# Patient Record
Sex: FEMALE | Race: WHITE | NOT HISPANIC OR LATINO
[De-identification: names, ages, dates, MRNs, and addresses within clinical notes are randomized per-mention and may not be internally consistent; named-entity substitution may affect disease eponyms.]

---

## 2017-07-29 ENCOUNTER — HEALTH MAINTENANCE LETTER (OUTPATIENT)
Age: 42
End: 2017-07-29

## 2017-08-17 ENCOUNTER — OFFICE VISIT (OUTPATIENT)
Dept: SURGERY | Facility: CLINIC | Age: 42
End: 2017-08-17
Payer: COMMERCIAL

## 2017-08-17 VITALS
WEIGHT: 190 LBS | HEART RATE: 65 BPM | DIASTOLIC BLOOD PRESSURE: 83 MMHG | HEIGHT: 68 IN | BODY MASS INDEX: 28.79 KG/M2 | SYSTOLIC BLOOD PRESSURE: 129 MMHG

## 2017-08-17 DIAGNOSIS — K43.2 INCISIONAL HERNIA, WITHOUT OBSTRUCTION OR GANGRENE: Primary | ICD-10-CM

## 2017-08-17 PROCEDURE — 99203 OFFICE O/P NEW LOW 30 MIN: CPT | Performed by: SURGERY

## 2017-08-17 NOTE — MR AVS SNAPSHOT
"              After Visit Summary   2017    Payton Graf    MRN: [id number]           Patient Information     Date Of Birth          1975        Visit Information        Provider Department      2017 10:15 AM Vishnu Melchor MD [facility]      Care Instructions    Your surgery is scheduled for 17 9:00 am at [facility]          Follow-ups after your visit        Who to contact     If you have questions or need follow up information about today's clinic visit or your schedule please contact [facility] directly at [phone].  Normal or non-critical lab and imaging results will be communicated to you by Zentacthart, letter or phone within 4 business days after the clinic has received the results. If you do not hear from us within 7 days, please contact the clinic through Zentacthart or phone. If you have a critical or abnormal lab result, we will notify you by phone as soon as possible.  Submit refill requests through TapMyBack or call your pharmacy and they will forward the refill request to us. Please allow 3 business days for your refill to be completed.          Additional Information About Your Visit        MyChart Information     TapMyBack lets you send messages to your doctor, view your test results, renew your prescriptions, schedule appointments and more. To sign up, go to [email or web address] . Click on \"Log in\" on the left side of the screen, which will take you to the Welcome page. Then click on \"Sign up Now\" on the right side of the page.     You will be asked to enter the access code listed below, as well as some personal information. Please follow the directions to create your username and password.     Your access code is: [email or web address]  Expires: 11/15/2017 11:13 AM     Your access code will  in 90 days. If you need help or a new code, please call your [place] clinic or [phone].   " "     Care EveryWhere ID     This is your Care EveryWhere ID. This could be used by other organizations to access your [place] medical records  [id number]        Your Vitals Were     Pulse Height BMI (Body Mass Index)             65 5' 8\" (1.727 m) 28.89 kg/m2          Blood Pressure from Last 3 Encounters:   08/17/17 129/83   01/20/04 120/78    Weight from Last 3 Encounters:   08/17/17 190 lb (86.2 kg)   01/20/04 156 lb (70.8 kg)              Today, you had the following     No orders found for display       Primary Care Provider Office Phone # Fax #    Alla Limon [phone]       [facility] [address]  [facility] [address]        Equal Access to Services     ARELY RUSSELL : Hadii aad ku hadasho Soomaali, waaxda luqadaha, qaybta kaalmada adeegyada, waxay dalilain hayjorge kaiser . So [facility] [phone].    ATENCIÓN: Si habla español, tiene a ramirez disposición servicios gratuitos de asistencia lingüística. Lamar al [phone].    We comply with applicable federal civil rights laws and [place] laws. We do not discriminate on the basis of race, color, national origin, age, disability sex, sexual orientation or gender identity.            Thank you!     Thank you for choosing [facility]  for your care. Our goal is always to provide you with excellent care. Hearing back from our patients is one way we can continue to improve our services. Please take a few minutes to complete the written survey that you may receive in the mail after your visit with us. Thank you!             Your Updated Medication List - Protect others around you: Learn how to safely use, store and throw away your medicines at www.disposemymeds.org.          This list is accurate as of: 8/17/17 11:13 AM.  Always use your most recent med list.                   Brand Name Dispense Instructions for use Diagnosis    CLOMID 50 MG tablet   Generic drug:  clomiPHENE      1 tab po days " 3-7        PROBIOTIC + OMEGA-3 PO           TAMIFLU 75 MG capsule   Generic drug:  oseltamivir     10    1 CAPSULE TWICE DAILY    Influenza with other respiratory manifestations       VITAMIN C PO           VITAMIN D (CHOLECALCIFEROL) PO      Take by mouth daily

## 2017-08-17 NOTE — PROGRESS NOTES
"[facility]  Surgery Consultation    HPI: Patient is a 42 year old female who is here for consultation requested by Alla Fu [phone] for evaluation of incisional hernia. The patient underwent a myomectomy at [facility]. This is done through a midline incision. She was known to have some supraumbilical hernias that were repaired during this operation. She developed a small protrusion on the right side of her incision site. Over the last year it has increased in size substantially. She now has a very large hernia. She is found to have multiple hernias along the upper portion of her incision site as well. She does not have severe pain but it is uncomfortable. She is having regular bowel movements and tolerating a regular diet. She denies other complaints today. She denies fevers, shortness of breath, chest pain, or bowel movement issues.    PMH:  None  PSH:   Myomectomy with hernia repair   Social History:   reports that she has never smoked. She does not have any smokeless tobacco history on file. She reports that she does not drink alcohol or use illicit drugs.  Family History:  Reviewed and noncontributory.  Medications/Allergies: Home medications and allergies reviewed.    ROS:  The 10 point Review of Systems is negative other than noted in the HPI.    Physical Exam:  /83  Pulse 65  Ht 5' 8\" (1.727 m)  Wt 190 lb (86.2 kg)  BMI 28.89 kg/m2  GENERAL: Generally appears well.  Psych: Alert and Oriented.  Normal affect  Eyes: Sclera clear  Respiratory:  Lungs clear to ausculation bilaterally with good air excursion  Cardiovascular:  Regular Rate and Rhythm with no murmurs gallops or rubs, normal peripheral pulses  GI: Abdomen soft. Large incisional hernia with protrusion onto the right. Able to reduce hernia and palpate underlying defect. She also has a diastases of the upper abdominal musculature measuring at least 2 cm. There are multiple small hernias throughout " this area. All are reducible and not painful.  Lymphatic/Hematologic/Immune:  No femoral or cervical lymphadenopathy.  Integumentary:  No rashes  Neurological: grossly intact     All new lab and imaging data was reviewed.     Impression and Plan:  Patient is a 42 year old female with large incisional hernia    PLAN:   I described the pathophysiology of incisional hernias including further workup and management. I also personally reviewed her imaging studies which shows a large lower incisional hernia as well as a upper midline diastases with multiple hernias. We discussed the significance of the diastases and her current hernias. We discussed possible options which could include a laparoscopic repair with mesh and open closure. We also discussed abdominal wall reconstruction. After reviewing her CT I feel she would require abdominal wall reconstruction to have a reasonable outcome. Her risk of recurrence would be extremely high with just a patch type repair given the large diastases and weakened area. We spent a long time describing the surgery in detail including postoperative recovery and management. She would like some time to think about it will likely schedule in the next few months when her schedule allows. The risks associated with the procedure including, but not limited to, recurrence, nerve entrapment or injury, persistence of pain, injury to the bowel/bladder, infertility, hematoma, mesh migration, mesh infection, MI, and PE were discussed with the patient. She indicated understanding of the discussion, asked appropriate questions, and provided consent. Signs and symptoms of incarceration were discussed. If these develop in the interim, she promises to call or go straight to the ER. I have provided the patient with an information pamphlet.      Thank you very much for this consult.    Vishnu Melchor M.D.  [facility]  [phone]    Please route or send letter to:  Primary Care Provider  (PCP) and Referring Provider

## 2017-08-17 NOTE — LETTER
"  2017    [facility]  Surgery Consultation    RE:  Payton Graf-:  [birth year]     HPI: Patient is a 42 year old female who is here for consultation requested by Alla Fu [phone] for evaluation of incisional hernia. The patient underwent a myomectomy at [facility]. This is done through a midline incision. She was known to have some supraumbilical hernias that were repaired during this operation. She developed a small protrusion on the right side of her incision site. Over the last year it has increased in size substantially. She now has a very large hernia. She is found to have multiple hernias along the upper portion of her incision site as well. She does not have severe pain but it is uncomfortable. She is having regular bowel movements and tolerating a regular diet. She denies other complaints today. She denies fevers, shortness of breath, chest pain, or bowel movement issues.     PMH:  None  PSH:   Myomectomy with hernia repair   Social History:   reports that she has never smoked. She does not have any smokeless tobacco history on file. She reports that she does not drink alcohol or use illicit drugs.  Family History:  Reviewed and noncontributory.  Medications/Allergies: Home medications and allergies reviewed.     ROS:  The 10 point Review of Systems is negative other than noted in the HPI.     Physical Exam:  /83  Pulse 65  Ht 5' 8\" (1.727 m)  Wt 190 lb (86.2 kg)  BMI 28.89 kg/m2  GENERAL: Generally appears well.  Psych: Alert and Oriented.  Normal affect  Eyes: Sclera clear  Respiratory:  Lungs clear to ausculation bilaterally with good air excursion  Cardiovascular:  Regular Rate and Rhythm with no murmurs gallops or rubs, normal peripheral pulses  GI: Abdomen soft. Large incisional hernia with protrusion onto the right. Able to reduce hernia and palpate underlying defect. She also has a diastases of the upper abdominal musculature measuring " at least 2 cm. There are multiple small hernias throughout this area. All are reducible and not painful.  Lymphatic/Hematologic/Immune:  No femoral or cervical lymphadenopathy.  Integumentary:  No rashes  Neurological: grossly intact      All new lab and imaging data was reviewed.      Impression and Plan:  Patient is a 42 year old female with large incisional hernia     PLAN:   I described the pathophysiology of incisional hernias including further workup and management. I also personally reviewed her imaging studies which shows a large lower incisional hernia as well as a upper midline diastases with multiple hernias. We discussed the significance of the diastases and her current hernias. We discussed possible options which could include a laparoscopic repair with mesh and open closure. We also discussed abdominal wall reconstruction. After reviewing her CT I feel she would require abdominal wall reconstruction to have a reasonable outcome. Her risk of recurrence would be extremely high with just a patch type repair given the large diastases and weakened area. We spent a long time describing the surgery in detail including postoperative recovery and management. She would like some time to think about it will likely schedule in the next few months when her schedule allows. The risks associated with the procedure including, but not limited to, recurrence, nerve entrapment or injury, persistence of pain, injury to the bowel/bladder, infertility, hematoma, mesh migration, mesh infection, MI, and PE were discussed with the patient. She indicated understanding of the discussion, asked appropriate questions, and provided consent. Signs and symptoms of incarceration were discussed. If these develop in the interim, she promises to call or go straight to the ER. I have provided the patient with an information pamphlet.        Thank you very much for this consult.     Vishnu Melchor M.D.  [facility]  [phone]

## 2017-10-07 PROCEDURE — 96372 THER/PROPH/DIAG INJ SC/IM: CPT

## 2017-11-06 ENCOUNTER — ANESTHESIA (OUTPATIENT)
Dept: SURGERY | Facility: CLINIC | Age: 42
DRG: 354 | End: 2017-11-06
Payer: COMMERCIAL

## 2017-11-06 ENCOUNTER — HOSPITAL ENCOUNTER (INPATIENT)
Facility: CLINIC | Age: 42
LOS: 1 days | Discharge: HOME | DRG: 354 | End: 2017-11-09
Attending: SURGERY | Admitting: SURGERY
Payer: COMMERCIAL

## 2017-11-06 ENCOUNTER — APPOINTMENT (OUTPATIENT)
Dept: SURGERY | Facility: PHYSICIAN GROUP | Age: 42
End: 2017-11-06
Payer: COMMERCIAL

## 2017-11-06 ENCOUNTER — ANESTHESIA EVENT (OUTPATIENT)
Dept: SURGERY | Facility: CLINIC | Age: 42
DRG: 354 | End: 2017-11-06
Payer: COMMERCIAL

## 2017-11-06 DIAGNOSIS — K43.9 VENTRAL HERNIA WITHOUT OBSTRUCTION OR GANGRENE: Primary | ICD-10-CM

## 2017-11-06 PROBLEM — G89.18 POST-OPERATIVE PAIN: Status: ACTIVE | Noted: 2017-11-06

## 2017-11-06 LAB
HCG UR QL: NEGATIVE
PLATELET # BLD AUTO: 237 10E9/L (ref 150–450)

## 2017-11-06 PROCEDURE — 25000132 ZZH RX MED GY IP 250 OP 250 PS 637: Performed by: SURGERY

## 2017-11-06 PROCEDURE — 36000056 ZZH SURGERY LEVEL 3 1ST 30 MIN: Performed by: SURGERY

## 2017-11-06 PROCEDURE — 25000128 H RX IP 250 OP 636: Performed by: SURGERY

## 2017-11-06 PROCEDURE — 37000009 ZZH ANESTHESIA TECHNICAL FEE, EACH ADDTL 15 MIN: Performed by: SURGERY

## 2017-11-06 PROCEDURE — C1781 MESH (IMPLANTABLE): HCPCS | Performed by: SURGERY

## 2017-11-06 PROCEDURE — 25000128 H RX IP 250 OP 636: Performed by: NURSE ANESTHETIST, CERTIFIED REGISTERED

## 2017-11-06 PROCEDURE — 0HB7XZZ EXCISION OF ABDOMEN SKIN, EXTERNAL APPROACH: ICD-10-PCS | Performed by: SURGERY

## 2017-11-06 PROCEDURE — 49568 ZZHC REPAIR HERNIA WITH MESH: CPT | Mod: 22 | Performed by: SURGERY

## 2017-11-06 PROCEDURE — 37000008 ZZH ANESTHESIA TECHNICAL FEE, 1ST 30 MIN: Performed by: SURGERY

## 2017-11-06 PROCEDURE — 25000125 ZZHC RX 250: Performed by: ANESTHESIOLOGY

## 2017-11-06 PROCEDURE — 49565 ZZHC REPAIR RECURR INCIS HERNIA,REDUC: CPT | Mod: 22 | Performed by: SURGERY

## 2017-11-06 PROCEDURE — 27210794 ZZH OR GENERAL SUPPLY STERILE: Performed by: SURGERY

## 2017-11-06 PROCEDURE — 25000566 ZZH SEVOFLURANE, EA 15 MIN: Performed by: SURGERY

## 2017-11-06 PROCEDURE — 96372 THER/PROPH/DIAG INJ SC/IM: CPT

## 2017-11-06 PROCEDURE — G0378 HOSPITAL OBSERVATION PER HR: HCPCS

## 2017-11-06 PROCEDURE — 25000125 ZZHC RX 250: Performed by: NURSE ANESTHETIST, CERTIFIED REGISTERED

## 2017-11-06 PROCEDURE — 81025 URINE PREGNANCY TEST: CPT | Performed by: SURGERY

## 2017-11-06 PROCEDURE — 40000170 ZZH STATISTIC PRE-PROCEDURE ASSESSMENT II: Performed by: SURGERY

## 2017-11-06 PROCEDURE — 36415 COLL VENOUS BLD VENIPUNCTURE: CPT | Performed by: SURGERY

## 2017-11-06 PROCEDURE — 71000013 ZZH RECOVERY PHASE 1 LEVEL 1 EA ADDTL HR: Performed by: SURGERY

## 2017-11-06 PROCEDURE — 25000125 ZZHC RX 250: Performed by: SURGERY

## 2017-11-06 PROCEDURE — 25000128 H RX IP 250 OP 636: Performed by: ANESTHESIOLOGY

## 2017-11-06 PROCEDURE — 85049 AUTOMATED PLATELET COUNT: CPT | Performed by: SURGERY

## 2017-11-06 PROCEDURE — 71000012 ZZH RECOVERY PHASE 1 LEVEL 1 FIRST HR: Performed by: SURGERY

## 2017-11-06 PROCEDURE — 0WUF0JZ SUPPLEMENT ABDOMINAL WALL WITH SYNTHETIC SUBSTITUTE, OPEN APPROACH: ICD-10-PCS | Performed by: SURGERY

## 2017-11-06 PROCEDURE — 36000058 ZZH SURGERY LEVEL 3 EA 15 ADDTL MIN: Performed by: SURGERY

## 2017-11-06 DEVICE — MESH VENTRALIGHT ST W/ECHO POS SYSTEM 6X10" OVAL 5955610: Type: IMPLANTABLE DEVICE | Site: ABDOMEN | Status: FUNCTIONAL

## 2017-11-06 RX ORDER — HEPARIN SODIUM 5000 [USP'U]/.5ML
5000 INJECTION, SOLUTION INTRAVENOUS; SUBCUTANEOUS EVERY 12 HOURS
Status: DISCONTINUED | OUTPATIENT
Start: 2017-11-06 | End: 2017-11-09 | Stop reason: HOSPADM

## 2017-11-06 RX ORDER — FENTANYL CITRATE 50 UG/ML
25-50 INJECTION, SOLUTION INTRAMUSCULAR; INTRAVENOUS
Status: DISCONTINUED | OUTPATIENT
Start: 2017-11-06 | End: 2017-11-06 | Stop reason: HOSPADM

## 2017-11-06 RX ORDER — KETOROLAC TROMETHAMINE 30 MG/ML
INJECTION, SOLUTION INTRAMUSCULAR; INTRAVENOUS PRN
Status: DISCONTINUED | OUTPATIENT
Start: 2017-11-06 | End: 2017-11-06

## 2017-11-06 RX ORDER — DIPHENHYDRAMINE HYDROCHLORIDE 50 MG/ML
INJECTION INTRAMUSCULAR; INTRAVENOUS PRN
Status: DISCONTINUED | OUTPATIENT
Start: 2017-11-06 | End: 2017-11-06

## 2017-11-06 RX ORDER — IBUPROFEN 600 MG/1
600 TABLET, FILM COATED ORAL EVERY 6 HOURS PRN
Status: DISCONTINUED | OUTPATIENT
Start: 2017-11-06 | End: 2017-11-09 | Stop reason: HOSPADM

## 2017-11-06 RX ORDER — NEOSTIGMINE METHYLSULFATE 1 MG/ML
VIAL (ML) INJECTION PRN
Status: DISCONTINUED | OUTPATIENT
Start: 2017-11-06 | End: 2017-11-06

## 2017-11-06 RX ORDER — SIMETHICONE 80 MG
80 TABLET,CHEWABLE ORAL 4 TIMES DAILY PRN
Status: DISCONTINUED | OUTPATIENT
Start: 2017-11-06 | End: 2017-11-09 | Stop reason: HOSPADM

## 2017-11-06 RX ORDER — FENTANYL CITRATE 50 UG/ML
INJECTION, SOLUTION INTRAMUSCULAR; INTRAVENOUS PRN
Status: DISCONTINUED | OUTPATIENT
Start: 2017-11-06 | End: 2017-11-06

## 2017-11-06 RX ORDER — PROPOFOL 10 MG/ML
INJECTION, EMULSION INTRAVENOUS PRN
Status: DISCONTINUED | OUTPATIENT
Start: 2017-11-06 | End: 2017-11-06

## 2017-11-06 RX ORDER — ONDANSETRON 4 MG/1
4 TABLET, ORALLY DISINTEGRATING ORAL EVERY 6 HOURS PRN
Status: DISCONTINUED | OUTPATIENT
Start: 2017-11-06 | End: 2017-11-09 | Stop reason: HOSPADM

## 2017-11-06 RX ORDER — NALOXONE HYDROCHLORIDE 0.4 MG/ML
.1-.4 INJECTION, SOLUTION INTRAMUSCULAR; INTRAVENOUS; SUBCUTANEOUS
Status: DISCONTINUED | OUTPATIENT
Start: 2017-11-06 | End: 2017-11-09 | Stop reason: HOSPADM

## 2017-11-06 RX ORDER — ONDANSETRON 2 MG/ML
INJECTION INTRAMUSCULAR; INTRAVENOUS PRN
Status: DISCONTINUED | OUTPATIENT
Start: 2017-11-06 | End: 2017-11-06

## 2017-11-06 RX ORDER — CEFAZOLIN SODIUM 1 G/3ML
1 INJECTION, POWDER, FOR SOLUTION INTRAMUSCULAR; INTRAVENOUS SEE ADMIN INSTRUCTIONS
Status: DISCONTINUED | OUTPATIENT
Start: 2017-11-06 | End: 2017-11-06 | Stop reason: HOSPADM

## 2017-11-06 RX ORDER — AMOXICILLIN 250 MG
1 CAPSULE ORAL 2 TIMES DAILY
Status: DISCONTINUED | OUTPATIENT
Start: 2017-11-06 | End: 2017-11-09 | Stop reason: HOSPADM

## 2017-11-06 RX ORDER — LIDOCAINE HYDROCHLORIDE 20 MG/ML
INJECTION, SOLUTION INFILTRATION; PERINEURAL PRN
Status: DISCONTINUED | OUTPATIENT
Start: 2017-11-06 | End: 2017-11-06

## 2017-11-06 RX ORDER — ACETAMINOPHEN 325 MG/1
650 TABLET ORAL EVERY 6 HOURS PRN
Status: DISCONTINUED | OUTPATIENT
Start: 2017-11-06 | End: 2017-11-09 | Stop reason: HOSPADM

## 2017-11-06 RX ORDER — ONDANSETRON 2 MG/ML
4 INJECTION INTRAMUSCULAR; INTRAVENOUS EVERY 6 HOURS PRN
Status: DISCONTINUED | OUTPATIENT
Start: 2017-11-06 | End: 2017-11-09 | Stop reason: HOSPADM

## 2017-11-06 RX ORDER — DEXAMETHASONE SODIUM PHOSPHATE 4 MG/ML
INJECTION, SOLUTION INTRA-ARTICULAR; INTRALESIONAL; INTRAMUSCULAR; INTRAVENOUS; SOFT TISSUE PRN
Status: DISCONTINUED | OUTPATIENT
Start: 2017-11-06 | End: 2017-11-06

## 2017-11-06 RX ORDER — PROCHLORPERAZINE MALEATE 5 MG
5-10 TABLET ORAL EVERY 6 HOURS PRN
Status: DISCONTINUED | OUTPATIENT
Start: 2017-11-06 | End: 2017-11-09 | Stop reason: HOSPADM

## 2017-11-06 RX ORDER — HYDROMORPHONE HYDROCHLORIDE 1 MG/ML
0.5 INJECTION, SOLUTION INTRAMUSCULAR; INTRAVENOUS; SUBCUTANEOUS EVERY 10 MIN PRN
Status: DISCONTINUED | OUTPATIENT
Start: 2017-11-06 | End: 2017-11-06 | Stop reason: HOSPADM

## 2017-11-06 RX ORDER — ONDANSETRON 2 MG/ML
4 INJECTION INTRAMUSCULAR; INTRAVENOUS EVERY 30 MIN PRN
Status: DISCONTINUED | OUTPATIENT
Start: 2017-11-06 | End: 2017-11-06 | Stop reason: HOSPADM

## 2017-11-06 RX ORDER — SODIUM CHLORIDE, SODIUM LACTATE, POTASSIUM CHLORIDE, CALCIUM CHLORIDE 600; 310; 30; 20 MG/100ML; MG/100ML; MG/100ML; MG/100ML
INJECTION, SOLUTION INTRAVENOUS CONTINUOUS
Status: DISCONTINUED | OUTPATIENT
Start: 2017-11-06 | End: 2017-11-06 | Stop reason: HOSPADM

## 2017-11-06 RX ORDER — ONDANSETRON 4 MG/1
4 TABLET, ORALLY DISINTEGRATING ORAL EVERY 30 MIN PRN
Status: DISCONTINUED | OUTPATIENT
Start: 2017-11-06 | End: 2017-11-06 | Stop reason: HOSPADM

## 2017-11-06 RX ORDER — OXYCODONE HYDROCHLORIDE 5 MG/1
5-10 TABLET ORAL
Qty: 30 TABLET | Refills: 0 | Status: SHIPPED | OUTPATIENT
Start: 2017-11-06

## 2017-11-06 RX ORDER — SODIUM CHLORIDE 9 MG/ML
INJECTION, SOLUTION INTRAVENOUS CONTINUOUS
Status: DISCONTINUED | OUTPATIENT
Start: 2017-11-06 | End: 2017-11-08

## 2017-11-06 RX ORDER — HYDROMORPHONE HYDROCHLORIDE 1 MG/ML
.3-.5 INJECTION, SOLUTION INTRAMUSCULAR; INTRAVENOUS; SUBCUTANEOUS
Status: DISCONTINUED | OUTPATIENT
Start: 2017-11-06 | End: 2017-11-07

## 2017-11-06 RX ORDER — NALOXONE HYDROCHLORIDE 0.4 MG/ML
.1-.4 INJECTION, SOLUTION INTRAMUSCULAR; INTRAVENOUS; SUBCUTANEOUS
Status: DISCONTINUED | OUTPATIENT
Start: 2017-11-06 | End: 2017-11-06 | Stop reason: HOSPADM

## 2017-11-06 RX ORDER — SODIUM CHLORIDE, SODIUM LACTATE, POTASSIUM CHLORIDE, CALCIUM CHLORIDE 600; 310; 30; 20 MG/100ML; MG/100ML; MG/100ML; MG/100ML
INJECTION, SOLUTION INTRAVENOUS CONTINUOUS PRN
Status: DISCONTINUED | OUTPATIENT
Start: 2017-11-06 | End: 2017-11-06

## 2017-11-06 RX ORDER — OXYCODONE HYDROCHLORIDE 5 MG/1
5 TABLET ORAL
Status: DISCONTINUED | OUTPATIENT
Start: 2017-11-06 | End: 2017-11-09 | Stop reason: HOSPADM

## 2017-11-06 RX ORDER — CALCIUM CARBONATE 500 MG/1
1 TABLET, CHEWABLE ORAL DAILY PRN
COMMUNITY

## 2017-11-06 RX ORDER — MEPERIDINE HYDROCHLORIDE 25 MG/ML
12.5 INJECTION INTRAMUSCULAR; INTRAVENOUS; SUBCUTANEOUS
Status: DISCONTINUED | OUTPATIENT
Start: 2017-11-06 | End: 2017-11-06 | Stop reason: HOSPADM

## 2017-11-06 RX ORDER — CEFAZOLIN SODIUM 2 G/100ML
2 INJECTION, SOLUTION INTRAVENOUS
Status: COMPLETED | OUTPATIENT
Start: 2017-11-06 | End: 2017-11-06

## 2017-11-06 RX ORDER — ACETAMINOPHEN 10 MG/ML
1000 INJECTION, SOLUTION INTRAVENOUS ONCE
Status: COMPLETED | OUTPATIENT
Start: 2017-11-06 | End: 2017-11-06

## 2017-11-06 RX ORDER — VECURONIUM BROMIDE 1 MG/ML
INJECTION, POWDER, LYOPHILIZED, FOR SOLUTION INTRAVENOUS PRN
Status: DISCONTINUED | OUTPATIENT
Start: 2017-11-06 | End: 2017-11-06

## 2017-11-06 RX ORDER — SCOLOPAMINE TRANSDERMAL SYSTEM 1 MG/1
1 PATCH, EXTENDED RELEASE TRANSDERMAL ONCE
Status: COMPLETED | OUTPATIENT
Start: 2017-11-06 | End: 2017-11-06

## 2017-11-06 RX ORDER — GLYCOPYRROLATE 0.2 MG/ML
INJECTION, SOLUTION INTRAMUSCULAR; INTRAVENOUS PRN
Status: DISCONTINUED | OUTPATIENT
Start: 2017-11-06 | End: 2017-11-06

## 2017-11-06 RX ORDER — PROPOFOL 10 MG/ML
INJECTION, EMULSION INTRAVENOUS CONTINUOUS PRN
Status: DISCONTINUED | OUTPATIENT
Start: 2017-11-06 | End: 2017-11-06

## 2017-11-06 RX ORDER — AMOXICILLIN 250 MG
1-2 CAPSULE ORAL 2 TIMES DAILY
Qty: 30 TABLET | Refills: 0 | Status: SHIPPED | OUTPATIENT
Start: 2017-11-06

## 2017-11-06 RX ADMIN — PHENYLEPHRINE HYDROCHLORIDE 100 MCG: 10 INJECTION INTRAVENOUS at 10:25

## 2017-11-06 RX ADMIN — PROCHLORPERAZINE EDISYLATE 10 MG: 5 INJECTION INTRAMUSCULAR; INTRAVENOUS at 17:15

## 2017-11-06 RX ADMIN — CEFAZOLIN SODIUM 2 G: 2 INJECTION, SOLUTION INTRAVENOUS at 09:30

## 2017-11-06 RX ADMIN — ONDANSETRON 4 MG: 2 INJECTION INTRAMUSCULAR; INTRAVENOUS at 11:06

## 2017-11-06 RX ADMIN — VECURONIUM BROMIDE 1 MG: 1 INJECTION, POWDER, LYOPHILIZED, FOR SOLUTION INTRAVENOUS at 09:50

## 2017-11-06 RX ADMIN — SODIUM CHLORIDE, POTASSIUM CHLORIDE, SODIUM LACTATE AND CALCIUM CHLORIDE: 600; 310; 30; 20 INJECTION, SOLUTION INTRAVENOUS at 10:03

## 2017-11-06 RX ADMIN — HYDROMORPHONE HYDROCHLORIDE 0.5 MG: 1 INJECTION, SOLUTION INTRAMUSCULAR; INTRAVENOUS; SUBCUTANEOUS at 11:59

## 2017-11-06 RX ADMIN — PHENYLEPHRINE HYDROCHLORIDE 50 MCG: 10 INJECTION INTRAVENOUS at 10:28

## 2017-11-06 RX ADMIN — CEFAZOLIN SODIUM 1 G: 2 INJECTION, SOLUTION INTRAVENOUS at 11:30

## 2017-11-06 RX ADMIN — HYDROMORPHONE HYDROCHLORIDE 0.5 MG: 1 INJECTION, SOLUTION INTRAMUSCULAR; INTRAVENOUS; SUBCUTANEOUS at 12:14

## 2017-11-06 RX ADMIN — HYDROMORPHONE HYDROCHLORIDE 0.5 MG: 1 INJECTION, SOLUTION INTRAMUSCULAR; INTRAVENOUS; SUBCUTANEOUS at 17:01

## 2017-11-06 RX ADMIN — FENTANYL CITRATE 50 MCG: 50 INJECTION, SOLUTION INTRAMUSCULAR; INTRAVENOUS at 09:34

## 2017-11-06 RX ADMIN — VECURONIUM BROMIDE 1 MG: 1 INJECTION, POWDER, LYOPHILIZED, FOR SOLUTION INTRAVENOUS at 10:30

## 2017-11-06 RX ADMIN — SUCCINYLCHOLINE CHLORIDE 100 MG: 20 INJECTION, SOLUTION INTRAMUSCULAR; INTRAVENOUS at 09:21

## 2017-11-06 RX ADMIN — SENNOSIDES AND DOCUSATE SODIUM 1 TABLET: 8.6; 5 TABLET ORAL at 20:52

## 2017-11-06 RX ADMIN — DIPHENHYDRAMINE HYDROCHLORIDE 12.5 MG: 50 INJECTION, SOLUTION INTRAMUSCULAR; INTRAVENOUS at 09:41

## 2017-11-06 RX ADMIN — ACETAMINOPHEN 1000 MG: 10 INJECTION, SOLUTION INTRAVENOUS at 12:54

## 2017-11-06 RX ADMIN — ONDANSETRON 4 MG: 2 SOLUTION INTRAMUSCULAR; INTRAVENOUS at 12:00

## 2017-11-06 RX ADMIN — HEPARIN SODIUM 5000 UNITS: 5000 INJECTION, SOLUTION INTRAVENOUS; SUBCUTANEOUS at 22:50

## 2017-11-06 RX ADMIN — FENTANYL CITRATE 100 MCG: 50 INJECTION, SOLUTION INTRAMUSCULAR; INTRAVENOUS at 09:21

## 2017-11-06 RX ADMIN — FENTANYL CITRATE 50 MCG: 50 INJECTION, SOLUTION INTRAMUSCULAR; INTRAVENOUS at 11:16

## 2017-11-06 RX ADMIN — LIDOCAINE HYDROCHLORIDE 60 MG: 20 INJECTION, SOLUTION INFILTRATION; PERINEURAL at 09:21

## 2017-11-06 RX ADMIN — PROPOFOL 170 MG: 10 INJECTION, EMULSION INTRAVENOUS at 09:21

## 2017-11-06 RX ADMIN — PROPOFOL 200 MCG/KG/MIN: 10 INJECTION, EMULSION INTRAVENOUS at 09:24

## 2017-11-06 RX ADMIN — NEOSTIGMINE METHYLSULFATE 4.5 MG: 1 INJECTION INTRAMUSCULAR; INTRAVENOUS; SUBCUTANEOUS at 11:11

## 2017-11-06 RX ADMIN — VECURONIUM BROMIDE 1 MG: 1 INJECTION, POWDER, LYOPHILIZED, FOR SOLUTION INTRAVENOUS at 10:00

## 2017-11-06 RX ADMIN — SODIUM CHLORIDE: 9 INJECTION, SOLUTION INTRAVENOUS at 14:31

## 2017-11-06 RX ADMIN — PHENYLEPHRINE HYDROCHLORIDE 50 MCG: 10 INJECTION INTRAVENOUS at 10:14

## 2017-11-06 RX ADMIN — DEXAMETHASONE SODIUM PHOSPHATE 4 MG: 4 INJECTION, SOLUTION INTRA-ARTICULAR; INTRALESIONAL; INTRAMUSCULAR; INTRAVENOUS; SOFT TISSUE at 09:41

## 2017-11-06 RX ADMIN — HYDROMORPHONE HYDROCHLORIDE 0.5 MG: 1 INJECTION, SOLUTION INTRAMUSCULAR; INTRAVENOUS; SUBCUTANEOUS at 21:25

## 2017-11-06 RX ADMIN — MIDAZOLAM HYDROCHLORIDE 1 MG: 1 INJECTION, SOLUTION INTRAMUSCULAR; INTRAVENOUS at 09:20

## 2017-11-06 RX ADMIN — VECURONIUM BROMIDE 1 MG: 1 INJECTION, POWDER, LYOPHILIZED, FOR SOLUTION INTRAVENOUS at 10:10

## 2017-11-06 RX ADMIN — HYDROMORPHONE HYDROCHLORIDE 0.5 MG: 1 INJECTION, SOLUTION INTRAMUSCULAR; INTRAVENOUS; SUBCUTANEOUS at 19:20

## 2017-11-06 RX ADMIN — SCOPALAMINE 1 PATCH: 1 PATCH, EXTENDED RELEASE TRANSDERMAL at 09:07

## 2017-11-06 RX ADMIN — ONDANSETRON 4 MG: 2 SOLUTION INTRAMUSCULAR; INTRAVENOUS at 16:41

## 2017-11-06 RX ADMIN — PHENYLEPHRINE HYDROCHLORIDE 50 MCG: 10 INJECTION INTRAVENOUS at 10:07

## 2017-11-06 RX ADMIN — HYDROMORPHONE HYDROCHLORIDE 0.5 MG: 1 INJECTION, SOLUTION INTRAMUSCULAR; INTRAVENOUS; SUBCUTANEOUS at 23:45

## 2017-11-06 RX ADMIN — SODIUM CHLORIDE, POTASSIUM CHLORIDE, SODIUM LACTATE AND CALCIUM CHLORIDE: 600; 310; 30; 20 INJECTION, SOLUTION INTRAVENOUS at 09:19

## 2017-11-06 RX ADMIN — FENTANYL CITRATE 50 MCG: 50 INJECTION, SOLUTION INTRAMUSCULAR; INTRAVENOUS at 13:18

## 2017-11-06 RX ADMIN — HYDROMORPHONE HYDROCHLORIDE 0.5 MG: 1 INJECTION, SOLUTION INTRAMUSCULAR; INTRAVENOUS; SUBCUTANEOUS at 14:36

## 2017-11-06 RX ADMIN — ONDANSETRON 4 MG: 2 SOLUTION INTRAMUSCULAR; INTRAVENOUS at 22:58

## 2017-11-06 RX ADMIN — FENTANYL CITRATE 50 MCG: 50 INJECTION, SOLUTION INTRAMUSCULAR; INTRAVENOUS at 10:41

## 2017-11-06 RX ADMIN — MIDAZOLAM HYDROCHLORIDE 2 MG: 1 INJECTION, SOLUTION INTRAMUSCULAR; INTRAVENOUS at 09:16

## 2017-11-06 RX ADMIN — GLYCOPYRROLATE 0.7 MG: 0.2 INJECTION, SOLUTION INTRAMUSCULAR; INTRAVENOUS at 11:11

## 2017-11-06 RX ADMIN — VECURONIUM BROMIDE 2 MG: 1 INJECTION, POWDER, LYOPHILIZED, FOR SOLUTION INTRAVENOUS at 09:34

## 2017-11-06 RX ADMIN — PROCHLORPERAZINE EDISYLATE 5 MG: 5 INJECTION INTRAMUSCULAR; INTRAVENOUS at 12:31

## 2017-11-06 RX ADMIN — KETOROLAC TROMETHAMINE 30 MG: 30 INJECTION, SOLUTION INTRAMUSCULAR at 11:33

## 2017-11-06 RX ADMIN — PROPOFOL: 10 INJECTION, EMULSION INTRAVENOUS at 09:53

## 2017-11-06 ASSESSMENT — ENCOUNTER SYMPTOMS: SEIZURES: 1

## 2017-11-06 ASSESSMENT — LIFESTYLE VARIABLES: TOBACCO_USE: 0

## 2017-11-06 NOTE — ANESTHESIA POSTPROCEDURE EVALUATION
Patient: Payton Graf    Procedure(s):  ABDOMINAL WALL RECONSTRUCTION - Wound Class: I-Clean    Diagnosis:INCISIONAL HERNIA  Diagnosis Additional Information: No value filed.    Anesthesia Type:  General, ETT    Note:  Anesthesia Post Evaluation    Patient location during evaluation: PACU  Patient participation: Able to fully participate in evaluation  Level of consciousness: awake and alert  Pain management: adequate  Airway patency: patent  Cardiovascular status: acceptable  Respiratory status: acceptable  Hydration status: acceptable  PONV: none     Anesthetic complications: None          Last vitals:  Vitals:    11/06/17 1245 11/06/17 1300 11/06/17 1315   BP: 126/77 126/78    Resp: 26 19 (P) 20   Temp: 36.9  C (98.4  F) 37  C (98.6  F)    SpO2: 95% 95%          Electronically Signed By: Cayden Baltazar MD  November 6, 2017  1:31 PM

## 2017-11-06 NOTE — OP NOTE
PREOPERATIVE DIAGNOSIS:   Recurrent incisional hernia      POSTOPERATIVE DIAGNOSIS: Recurrent incisional hernia       PROCEDURE:   1-Exploratory laparotomy        2-Lysis of adhesions        3-Laparoscopic repair of recurrent incisional hernia with mesh        4-Excision of 400 cubic centimeters of skin with complex layered closure      SURGEON:  Vishnu Melchor MD       ASSISTANT: Jeffery Olivas MD- [facility] Resident    ANESTHESIA:  GET.       COMPLICATIONS:  None.       BLOOD LOSS:  20 cc      FINDINGS: Multiple recurrent incisional hernias      INDICATIONS: Mrs. Graf presented to the office with a recurrent incisional hernia. She had a robotic myomectomy and supraumbilical hernia repair and has developed a very large and painful hernia. She feels multiple hernias along her incision site. I offered the patient open and laparoscopic repair of the hernias with possible abdominal wall reconstruction depending on findings. She agreed.   I explained the risks, benefits, complications including but not limited to bleeding, infection, possible postop hematoma, seroma, bowel or bladder injury, anastomotic dehiscence, ureter injury, recurrence of hernia, chronic pain, and need for additional procedures if any of these occur.  The patient agreed and did sign consent.       DETAILS OF PROCEDURE:  The patient was brought to the operating room per Anesthesia, placed in supine position, intubated without difficulty.  Patient was prepped and draped in the usual fashion using ChloraPrep. Surgical timeout was then performed to identify correct surgeon, site, procedure and patient.  All in the room were in agreement. OG tube were placed. A midline incision was made through her previous incision site. Cautery was used to get through the subcutaneous tissue which was very thin due to multiple hernias. Entry was made with sharp dissection. She had some anterior abdominal wall adhesions that were taken down with cautery. The falciform  ligament was also taken down up to the sternal notch to aid in mesh placement. She also had some adhesions in the pelvis that were taken down with blunt and cautery dissection when appropriate. This freed up the remaining bowel and there were no other anterior abdominal wall hernias. There were multiple large incisional hernias throughout the incision. The largest one measured approximately 4 cm and tracked to the patient's right. There was a very large sac into the subcutaneous tissue in this area. There were multiple other smaller ones on the left and right side of her incision site. All hernias were completely reduced and the hernia sacs were removed. Subcutaneous flaps were then created to get down to healthy fascia. She had a diastases in her midline and this was excised down until good fascia and muscle tissue. Eventually I was able to completely free all hernias and redundant tissue to good fascial margins. The fascia was brought together with minimal effort and revealed that there was no tension whatsoever. A 6 x 10 Bard mesh was placed intra-abdominally. A 5 trocar was also directly inserted under direct visualization. The fascia was then easily closed with a running 1 PDS starting cephalad and caudad and tying in the middle. This came together very nicely without any tension. There was small diastases in the upper abdominal area but there was intact fascia so this was left in place. Once the fascia was closed the abdomen was insufflated to a pressure of 15 which patient tolerated well. The camera was inserted and revealed no obvious injuries. 2 additional 5 trochars are placed under direct visualization. The echo system was deployed and the mesh was circumferentially anchored with the reticulating absorbable tacks. This laid flush against the anterior abdominal wall. Multiple tacks were also placed in the middle to reduce the dead space. I inspected the underlying bowel and omentum which revealed no  injuries or bleeding. All trochars were taken out under direct visualization. The patient had a significant amount of redundant skin due to the chronic and large nature of her hernia. This was excised to approximate 400 cm . Cautery was used for hemostasis. A drain was placed in the subcutaneous tissue. The wound was closed in multiple layers starting with the deeper layer with a 2-0 Vicryl. A 3-0 Vicryl and 4-0 Monocryl were used to close the skin. The wound came together with no tension. There is no bleeding. It was well vascularized and pink. Steri-Strips are placed on the skin.  All sponge, instrument and needle counts were correct.  The patient tolerated the procedure well and transferred to PACU in stable condition.  A PA was necessary for patient positioning, prepping, operating the camera, aiding in retraction and exposure, performing the anastomosis and closure.           DHIRAJ GASTELUM MD

## 2017-11-06 NOTE — BRIEF OP NOTE
[facility] Brief Operative Note    Pre-operative diagnosis: INCISIONAL HERNIA   Post-operative diagnosis * No post-op diagnosis entered *  Same   Procedure: Procedure(s):  ABDOMINAL WALL RECONSTRUCTION - Wound Class: I-Clean   Surgeon(s): Surgeon(s) and Role:     * Vishnu Melchor MD - Primary     * Jeffery Olivas MD - Resident - Assisting   Estimated blood loss: 20 mL    Specimens: * No specimens in log *   Findings: S/p resection of anterior hernia sac and primary closure of resulting fascial defect and laparoscopic placement of underlay mesh.

## 2017-11-06 NOTE — PROGRESS NOTES
Admission medication history interview status for the 11/6/2017  admission is complete. See EPIC admission navigator for prior to admission medications     Medication history source reliability:Good    Medication history interview source(s):Patient    Medication history resources (including written lists, pill bottles, clinic record):None    Primary pharmacy.[facility]    Additional medication history information not noted on PTA med list :None    Time spent in this activity: 45 minutes    Prior to Admission medications    Medication Sig Last Dose Taking? Auth Provider   IBUPROFEN PO Take 400 mg by mouth 2 times daily as needed for moderate pain 10/30/2017 at prn Yes Reported, Patient   ASPIRIN PO Take 81 mg by mouth daily as needed for moderate pain (before travelling) more than a week at prn Yes Reported, Patient   MAGNESIUM CITRATE PO Take 1 Dose by mouth every evening 11/5/2017 at pm Yes Reported, Patient   Simethicone (GAS-X PO) Take 1 tablet by mouth 4 times daily as needed for intestinal gas more than a week at prn Yes Reported, Patient   calcium carbonate (TUMS) 500 MG chewable tablet Take 1 chew tab by mouth daily as needed for heartburn more than a week at prn Yes Reported, Patient   VITAMIN D, CHOLECALCIFEROL, PO Take 5,000 Units by mouth daily as needed  11/5/2017 at prn Yes Reported, Patient   Probiotic Product (PROBIOTIC + OMEGA-3 PO) Take 1 tablet by mouth daily  11/4/2017 Yes Reported, Patient   Ascorbic Acid (VITAMIN C PO) Take 1 Dose by mouth daily as needed  11/4/2017 at prn Yes Reported, Patient

## 2017-11-06 NOTE — ANESTHESIA CARE TRANSFER NOTE
Patient: Payton Graf    Procedure(s):  ABDOMINAL WALL RECONSTRUCTION - Wound Class: I-Clean    Diagnosis: INCISIONAL HERNIA  Diagnosis Additional Information: No value filed.    Anesthesia Type:   General, ETT     Note:  Airway :Face Mask  Patient transferred to:PACU  Comments: Pt awake and able to verbalize needs. ALL monitors on and audible. Vital signs stable. Spontaneous respiration without difficulty. o2 sats 98% on 10Lo2 per face mask. Pt denies pain. Report given to PACU RN.Handoff Report: Identifed the Patient, Identified the Reponsible Provider, Reviewed the pertinent medical history, Discussed the surgical course, Reviewed Intra-OP anesthesia mangement and issues during anesthesia, Set expectations for post-procedure period and Allowed opportunity for questions and acknowledgement of understanding      Vitals: (Last set prior to Anesthesia Care Transfer)    CRNA VITALS  11/6/2017 1115 - 11/6/2017 1151      11/6/2017             Resp Rate (observed): 10                Electronically Signed By: JANETT Camargo CRNA  November 6, 2017  11:51 AM  
no

## 2017-11-06 NOTE — IP AVS SNAPSHOT
MRN:[id number]                      After Visit Summary   11/6/2017    Payton Graf    MRN: [id number]           Thank you!     Thank you for choosing [facility] for your care. Our goal is always to provide you with excellent care. Hearing back from our patients is one way we can continue to improve our services. Please take a few minutes to complete the written survey that you may receive in the mail after you visit with us. Thank you!        Patient Information     Date Of Birth          1975        Designated Caregiver       Most Recent Value    Caregiver    Will someone help with your care after discharge? yes    Name of designated caregiver Joi, spouse    Phone number of caregiver [phone]    Caregiver address [address] [place] [address]      About your hospital stay     You were admitted on:  November 6, 2017 You last received care in the:  [facility]    You were discharged on:  November 9, 2017        Reason for your hospital stay       You were in the hospital for recovery after surgery to repair a hernia.                  Who to Call     For medical emergencies, please call 911.  For non-urgent questions about your medical care, please call your primary care provider or clinic, [phone]  For questions related to your surgery, please call your surgery clinic        Attending Provider     Provider Specialty    Vishnu Melchor MD Surgery       Primary Care Provider Office Phone # Fax #    Alla Salazar Dahiana Limon [phone]      After Care Instructions     Activity       Your activity upon discharge: activity as tolerated            Diet       Follow this diet upon discharge: Orders Placed This Encounter      Regular Diet Adult            Dressing       Keep dressing clean and dry.  Surgical dressing to be removed on POD#2. Steri-strips will remain for 7-10 days            NO lifting       NO lifting over  10   lbs and no strenuous physical activity for  4  weeks.            Shower       May shower on post-op day  2 if drains is removed.            Tubes and drains       You are going home with the following tubes or drains: MATIAS.  Tube cares per hospital or home care instructions            Wound care and dressings       Instructions to care for your wound at home: may get incision wet in shower but do not soak or scrub and reinforce dressing as needed.                  Follow-up Appointments     Follow-up and recommended labs and tests        Follow up with Dr. Melchor , within 1 week  to evaluate after surgery.                  Further instructions from your care team       Discharge Instructions following General Abdominal Surgery  [facility]    Diet:    Diet as instructed by surgeon.  Avoid gas forming foods.  You may find your appetite to be diminished briefly after surgery.  Drink plenty of fluids, especially water.  Activities:    Activity as tolerated and instructed by surgeon.  Take frequent, short walks.  No strenuous activity or heavy lifting (greater than 10-15 lbs) until approved by surgeon.  Bathing/Incision Care    Ok to shower.  Do not submerge incision (no tub baths or swimming) until advised by surgeon.  Pat incisions dry.  If present, staples will be removed in the office.  If present, tape dressing (Steri-Strips) will fall off on their own in 7-10 days.  No lotion, powders, or perfumes near or on the incision.  What to expect:    A tingly or itchy sensation around the incision is a sign of normal healing.    A small amount of clear, pink drainage from the incision is normal.  Call your surgeon if you have these signs or symptoms:    Fever greater than 101 oF or chills.    Redness, swelling, warmth, foul-smelling drainage from incision.    Increased pain that does not improve with pain medicine.    With any questions or concerns.        Pending Results     No orders  "found from 2017 to 2017.            Admission Information     Date & Time Provider Department Dept. Phone    2017 Vishnu Melchor MD [facility] [address] [facility] [phone]      Your Vitals Were     Blood Pressure Pulse Temperature Respirations Height Weight    128/83 (BP Location: Left arm) 79 98.3  F (36.8  C) (Oral) 16 1.727 m (5' 8\") 91.3 kg (201 lb 4.8 oz)    Last Period Pulse Oximetry BMI (Body Mass Index)             2017 96% 30.61 kg/m2         MyChart Information     [email or web address] lets you send messages to your doctor, view your test results, renew your prescriptions, schedule appointments and more. To sign up, go to [email or web address] . Click on \"Log in\" on the left side of the screen, which will take you to the Welcome page. Then click on \"Sign up Now\" on the right side of the page.     You will be asked to enter the access code listed below, as well as some personal information. Please follow the directions to create your username and password.     Your access code is: [email or web address]  Expires: 11/15/2017 10:13 AM     Your access code will  in 90 days. If you need help or a new code, please call your [facility] clinic or [phone].        Care EveryWhere ID     This is your Care EveryWhere ID. This could be used by other organizations to access your [facility] medical records  [id number]        Equal Access to Services     ARELY RUSSELL : Hadkaylyn Lynch, waaxda luqadaha, qaybta kaalmaisac arias, fifi anderson. So [facility] [phone].    ATENCIÓN: Si habla español, tiene a ramirez disposición servicios gratuitos de asistencia lingüística. Llame al [phone].    We comply with applicable federal civil rights laws and [place] laws. We do not discriminate on the basis of race, color, national origin, age, disability, sex, sexual orientation, or gender identity.               Review of your medicines      START taking     "    Dose / Directions    ondansetron 4 MG ODT tab   Commonly known as:  ZOFRAN-ODT   Used for:  Ventral hernia without obstruction or gangrene        Dose:  4 mg   Take 1 tablet (4 mg) by mouth every 6 hours as needed for nausea or vomiting   Quantity:  12 tablet   Refills:  0       * oxyCODONE IR 5 MG tablet   Commonly known as:  ROXICODONE   Used for:  Ventral hernia without obstruction or gangrene        Dose:  5-10 mg   Take 1-2 tablets (5-10 mg) by mouth every 3 hours as needed for other (Moderate to Severe Pain)   Quantity:  30 tablet   Refills:  0       * oxyCODONE IR 5 MG tablet   Commonly known as:  ROXICODONE   Used for:  Ventral hernia without obstruction or gangrene        Dose:  5 mg   Take 1 tablet (5 mg) by mouth every 3 hours as needed for moderate to severe pain   Quantity:  30 tablet   Refills:  0       * senna-docusate 8.6-50 MG per tablet   Commonly known as:  SENOKOT-S;PERICOLACE   Used for:  Ventral hernia without obstruction or gangrene        Dose:  1-2 tablet   Take 1-2 tablets by mouth 2 times daily   Quantity:  30 tablet   Refills:  0       * senna-docusate 8.6-50 MG per tablet   Commonly known as:  SENOKOT-S;PERICOLACE   Used for:  Ventral hernia without obstruction or gangrene        Dose:  1 tablet   Take 1 tablet by mouth 2 times daily   Quantity:  30 tablet   Refills:  0       * Notice:  This list has 4 medication(s) that are the same as other medications prescribed for you. Read the directions carefully, and ask your doctor or other care provider to review them with you.      CONTINUE these medicines which have NOT CHANGED        Dose / Directions    ASPIRIN PO        Dose:  81 mg   Take 81 mg by mouth daily as needed for moderate pain (before travelling)   Refills:  0       calcium carbonate 500 MG chewable tablet   Commonly known as:  TUMS        Dose:  1 chew tab   Take 1 chew tab by mouth daily as needed for heartburn   Refills:  0       GAS-X PO        Dose:  1 tablet   Take 1  tablet by mouth 4 times daily as needed for intestinal gas   Refills:  0       IBUPROFEN PO        Dose:  400 mg   Take 400 mg by mouth 2 times daily as needed for moderate pain   Refills:  0       MAGNESIUM CITRATE PO        Dose:  1 Dose   Take 1 Dose by mouth every evening   Refills:  0       PROBIOTIC + OMEGA-3 PO        Dose:  1 tablet   Take 1 tablet by mouth daily   Refills:  0       VITAMIN C PO        Dose:  1 Dose   Take 1 Dose by mouth daily as needed   Refills:  0       VITAMIN D (CHOLECALCIFEROL) PO        Dose:  5000 Units   Take 5,000 Units by mouth daily as needed   Refills:  0            Where to get your medicines      Some of these will need a paper prescription and others can be bought over the counter. Ask your nurse if you have questions.     Bring a paper prescription for each of these medications     ondansetron 4 MG ODT tab    oxyCODONE IR 5 MG tablet    senna-docusate 8.6-50 MG per tablet       You don't need a prescription for these medications     oxyCODONE IR 5 MG tablet    senna-docusate 8.6-50 MG per tablet                Protect others around you: Learn how to safely use, store and throw away your medicines at www.disposemymeds.org.             Medication List: This is a list of all your medications and when to take them. Check marks below indicate your daily home schedule. Keep this list as a reference.      Medications           Morning Afternoon Evening Bedtime As Needed    ASPIRIN PO   Take 81 mg by mouth daily as needed for moderate pain (before travelling)                                calcium carbonate 500 MG chewable tablet   Commonly known as:  TUMS   Take 1 chew tab by mouth daily as needed for heartburn                                GAS-X PO   Take 1 tablet by mouth 4 times daily as needed for intestinal gas                                IBUPROFEN PO   Take 400 mg by mouth 2 times daily as needed for moderate pain   Last time this was given:  600 mg on 11/9/2017  5:28 AM                                    MAGNESIUM CITRATE PO   Take 1 Dose by mouth every evening                                ondansetron 4 MG ODT tab   Commonly known as:  ZOFRAN-ODT   Take 1 tablet (4 mg) by mouth every 6 hours as needed for nausea or vomiting   Last time this was given:  4 mg on 11/8/2017  7:53 AM                                   * oxyCODONE IR 5 MG tablet   Commonly known as:  ROXICODONE   Take 1-2 tablets (5-10 mg) by mouth every 3 hours as needed for other (Moderate to Severe Pain)   Last time this was given:  5 mg on 11/9/2017  9:31 AM                                   * oxyCODONE IR 5 MG tablet   Commonly known as:  ROXICODONE   Take 1 tablet (5 mg) by mouth every 3 hours as needed for moderate to severe pain   Last time this was given:  5 mg on 11/9/2017  9:31 AM                                   PROBIOTIC + OMEGA-3 PO   Take 1 tablet by mouth daily                                * senna-docusate 8.6-50 MG per tablet   Commonly known as:  SENOKOT-S;PERICOLACE   Take 1-2 tablets by mouth 2 times daily   Last time this was given:  1 tablet on 11/9/2017  9:28 AM                                      * senna-docusate 8.6-50 MG per tablet   Commonly known as:  SENOKOT-S;PERICOLACE   Take 1 tablet by mouth 2 times daily   Last time this was given:  1 tablet on 11/9/2017  9:28 AM                                VITAMIN C PO   Take 1 Dose by mouth daily as needed                                VITAMIN D (CHOLECALCIFEROL) PO   Take 5,000 Units by mouth daily as needed                                * Notice:  This list has 4 medication(s) that are the same as other medications prescribed for you. Read the directions carefully, and ask your doctor or other care provider to review them with you.

## 2017-11-06 NOTE — IP AVS SNAPSHOT
[facility] [address] [facility]    [address] [place] [address]    Phone:  [phone]                                       After Visit Summary   11/6/2017    Payton Graf    MRN: [id number]           After Visit Summary Signature Page     I have received my discharge instructions, and my questions have been answered. I have discussed any challenges I see with this plan with the nurse or doctor.    ..........................................................................................................................................  Patient/Patient Representative Signature      ..........................................................................................................................................  Patient Representative Print Name and Relationship to Patient    ..................................................               ................................................  Date                                            Time    ..........................................................................................................................................  Reviewed by Signature/Title    ...................................................              ..............................................  Date                                                            Time

## 2017-11-06 NOTE — ANESTHESIA PREPROCEDURE EVALUATION
Procedure: Procedure(s):  RECONSTRUCT ABDOMINAL WALL  Preop diagnosis: INCISIONAL HERNIA    Allergies   Allergen Reactions     No Known Drug Allergies      Past Medical History:   Diagnosis Date     Incisional hernia      Obese      PONV (postoperative nausea and vomiting)      Seizures (H)     12 years ago x 1, no meds     Syncope     x1     Past Surgical History:   Procedure Laterality Date     HERNIA REPAIR      incisional hernia repair     MYOMECTOMY UTERUS       Social History   Substance Use Topics     Smoking status: Never Smoker     Smokeless tobacco: Never Used     Alcohol use Yes      Comment: occ     Prior to Admission medications    Medication Sig Start Date End Date Taking? Authorizing Provider   IBUPROFEN PO Take 400 mg by mouth 2 times daily as needed for moderate pain   Yes Reported, Patient   ASPIRIN PO Take 81 mg by mouth daily as needed for moderate pain (before travelling)   Yes Reported, Patient   MAGNESIUM CITRATE PO Take 1 Dose by mouth every evening   Yes Reported, Patient   Simethicone (GAS-X PO) Take 1 tablet by mouth 4 times daily as needed for intestinal gas   Yes Reported, Patient   calcium carbonate (TUMS) 500 MG chewable tablet Take 1 chew tab by mouth daily as needed for heartburn   Yes Reported, Patient   VITAMIN D, CHOLECALCIFEROL, PO Take 5,000 Units by mouth daily as needed    Yes Reported, Patient   Probiotic Product (PROBIOTIC + OMEGA-3 PO) Take 1 tablet by mouth daily    Yes Reported, Patient   Ascorbic Acid (VITAMIN C PO) Take 1 Dose by mouth daily as needed    Yes Reported, Patient     Current Facility-Administered Medications Ordered in Epic   Medication Dose Route Frequency Last Rate Last Dose     ceFAZolin sodium-dextrose (ANCEF) infusion 2 g  2 g Intravenous Pre-Op/Pre-procedure x 1 dose         ceFAZolin (ANCEF) 1 g vial to attach to  ml bag for ADULT or 50 ml bag for PEDS  1 g Intravenous See Admin Instructions         No current Epic-ordered outpatient  prescriptions on file.        Wt Readings from Last 1 Encounters:   11/06/17 91.3 kg (201 lb 4.8 oz)     Temp Readings from Last 1 Encounters:   11/06/17 35.9  C (96.6  F) (Oral)     BP Readings from Last 6 Encounters:   11/06/17 130/82   08/17/17 129/83   01/20/04 120/78     Pulse Readings from Last 4 Encounters:   08/17/17 65     Resp Readings from Last 1 Encounters:   11/06/17 16     SpO2 Readings from Last 1 Encounters:   11/06/17 99%     Recent Labs   Lab Test 12/07/16   POTASSIUM  4.8   GLC  92   CR  0.81     No results for input(s): AST, ALT, ALKPHOS, BILITOTAL, LIPASE in the last 97375 hours.  No results for input(s): WBC, HGB, PLT in the last 74134 hours.  No results for input(s): ABO, RH in the last 55503 hours.  No results for input(s): INR, PTT in the last 58020 hours.   No results for input(s): TROPI in the last 73187 hours.  No results for input(s): PH, PCO2, PO2, HCO3 in the last 40001 hours.  Recent Labs   Lab Test  11/06/17   0750   HCG  Negative     No results found for this or any previous visit (from the past 744 hour(s)).    RECENT LABS:   ECG:   ECHO:     Anesthesia Evaluation     . Pt has had prior anesthetic. Type: General    History of anesthetic complications   - PONV        ROS/MED HX    ENT/Pulmonary:      (-) tobacco use and sleep apnea   Neurologic:     (+)seizures     Cardiovascular:         METS/Exercise Tolerance:     Hematologic:         Musculoskeletal:         GI/Hepatic:        (-) GERD   Renal/Genitourinary:         Endo:     (+) Obesity, .      Psychiatric:         Infectious Disease:         Malignancy:         Other:                     Physical Exam  Normal systems: cardiovascular, pulmonary and dental    Airway   Mallampati: II  TM distance: >3 FB  Neck ROM: full    Dental     Cardiovascular       Pulmonary                     Anesthesia Plan      History & Physical Review  History and physical reviewed and following examination; no interval change.    ASA Status:  2 .     NPO Status:  > 8 hours    Plan for General and ETT with Intravenous and Propofol induction. Maintenance will be TIVA.    PONV prophylaxis:  Ondansetron (or other 5HT-3) and Dexamethasone or Solumedrol  12.5mg Benadryl, 4mg Decadron, TIVA and Zofran for PONV      Postoperative Care  Postoperative pain management:  IV analgesics and Oral pain medications.      Consents  Anesthetic plan, risks, benefits and alternatives discussed with:  Patient..                          .

## 2017-11-07 LAB — GLUCOSE BLDC GLUCOMTR-MCNC: 112 MG/DL (ref 70–99)

## 2017-11-07 PROCEDURE — G0378 HOSPITAL OBSERVATION PER HR: HCPCS

## 2017-11-07 PROCEDURE — 25000125 ZZHC RX 250: Performed by: SURGERY

## 2017-11-07 PROCEDURE — 00000146 ZZHCL STATISTIC GLUCOSE BY METER IP

## 2017-11-07 PROCEDURE — 96372 THER/PROPH/DIAG INJ SC/IM: CPT

## 2017-11-07 PROCEDURE — 25000128 H RX IP 250 OP 636: Performed by: SURGERY

## 2017-11-07 PROCEDURE — 25000132 ZZH RX MED GY IP 250 OP 250 PS 637: Performed by: SURGERY

## 2017-11-07 RX ORDER — HYDROMORPHONE HYDROCHLORIDE 1 MG/ML
.3-.5 INJECTION, SOLUTION INTRAMUSCULAR; INTRAVENOUS; SUBCUTANEOUS
Status: DISCONTINUED | OUTPATIENT
Start: 2017-11-07 | End: 2017-11-09 | Stop reason: HOSPADM

## 2017-11-07 RX ORDER — DIAZEPAM 10 MG/2ML
2.5 INJECTION, SOLUTION INTRAMUSCULAR; INTRAVENOUS EVERY 4 HOURS PRN
Status: DISCONTINUED | OUTPATIENT
Start: 2017-11-07 | End: 2017-11-09 | Stop reason: HOSPADM

## 2017-11-07 RX ADMIN — SODIUM CHLORIDE: 9 INJECTION, SOLUTION INTRAVENOUS at 14:57

## 2017-11-07 RX ADMIN — HYDROMORPHONE HYDROCHLORIDE 0.5 MG: 1 INJECTION, SOLUTION INTRAMUSCULAR; INTRAVENOUS; SUBCUTANEOUS at 02:06

## 2017-11-07 RX ADMIN — SENNOSIDES AND DOCUSATE SODIUM 1 TABLET: 8.6; 5 TABLET ORAL at 07:53

## 2017-11-07 RX ADMIN — ONDANSETRON 4 MG: 4 TABLET, ORALLY DISINTEGRATING ORAL at 17:35

## 2017-11-07 RX ADMIN — ACETAMINOPHEN 650 MG: 325 TABLET, FILM COATED ORAL at 16:27

## 2017-11-07 RX ADMIN — PROCHLORPERAZINE EDISYLATE 10 MG: 5 INJECTION INTRAMUSCULAR; INTRAVENOUS at 04:24

## 2017-11-07 RX ADMIN — HYDROMORPHONE HYDROCHLORIDE 0.5 MG: 1 INJECTION, SOLUTION INTRAMUSCULAR; INTRAVENOUS; SUBCUTANEOUS at 06:11

## 2017-11-07 RX ADMIN — SENNOSIDES AND DOCUSATE SODIUM 1 TABLET: 8.6; 5 TABLET ORAL at 21:33

## 2017-11-07 RX ADMIN — HYDROMORPHONE HYDROCHLORIDE 0.5 MG: 1 INJECTION, SOLUTION INTRAMUSCULAR; INTRAVENOUS; SUBCUTANEOUS at 12:31

## 2017-11-07 RX ADMIN — HYDROMORPHONE HYDROCHLORIDE 0.5 MG: 1 INJECTION, SOLUTION INTRAMUSCULAR; INTRAVENOUS; SUBCUTANEOUS at 08:43

## 2017-11-07 RX ADMIN — HEPARIN SODIUM 5000 UNITS: 5000 INJECTION, SOLUTION INTRAVENOUS; SUBCUTANEOUS at 11:28

## 2017-11-07 RX ADMIN — HYDROMORPHONE HYDROCHLORIDE 0.5 MG: 1 INJECTION, SOLUTION INTRAMUSCULAR; INTRAVENOUS; SUBCUTANEOUS at 04:15

## 2017-11-07 RX ADMIN — OXYCODONE HYDROCHLORIDE 5 MG: 5 TABLET ORAL at 21:33

## 2017-11-07 RX ADMIN — OXYCODONE HYDROCHLORIDE 5 MG: 5 TABLET ORAL at 14:47

## 2017-11-07 RX ADMIN — OXYCODONE HYDROCHLORIDE 5 MG: 5 TABLET ORAL at 18:39

## 2017-11-07 RX ADMIN — OXYCODONE HYDROCHLORIDE 5 MG: 5 TABLET ORAL at 10:17

## 2017-11-07 RX ADMIN — SODIUM CHLORIDE: 9 INJECTION, SOLUTION INTRAVENOUS at 02:06

## 2017-11-07 NOTE — PLAN OF CARE
Problem: Patient Care Overview  Goal: Plan of Care/Patient Progress Review  Outcome: Improving  A&Ox4.  VSS.  Afebrile.  Dressing to Abdominal incisions-CDI.  MATIAS drain intact.  Received IV Dilaudid, Zofran & Compazine for pain & nausea.  Due to void.  On IVF.

## 2017-11-07 NOTE — PLAN OF CARE
Problem: Patient Care Overview  Goal: Plan of Care/Patient Progress Review  Outcome: No Change  Pt AOx4. VSS on RA. Pain up to 8/10 in abd, taking IV dilaudid. Denies n/v. Abdominal binder in place. MATIAS drain to suction, abdominal dressings CDI. , orders obtained for straight cath. Straight cathed for 650 ml. Plan to continue with PVR. Tolerating clear liquid diet at this time. Up Assist of 1-2 to BSC. D/C pending progress. Will continue to monitor.

## 2017-11-07 NOTE — PLAN OF CARE
Problem: Patient Care Overview  Goal: Plan of Care/Patient Progress Review  Outcome: Improving  A&Ox4. VSS. IVF infusing. SBA. PCDs on. Ambulating in room to bathroom, voided x1 during shift, tried going again, unsuccessful, bladder scanned for 104mL, continue to monitor. Pain managed w/ IV dilaudid. CLD. C/o nausea, compazine given. MATIAS drain to suction, Abd dx CDI, sm amount of dried drainage, outlined. CMS intact. Continue to monitor.

## 2017-11-07 NOTE — PROGRESS NOTES
[facility]    General Surgery  Daily Post-Op Note       Assessment and Plan:   Payton Graf is a 42 year old female S/P Procedure(s):  RECONSTRUCT ABDOMINAL WALL, 1 Day Post-Op    Pain: Controlled on IV dilaudid. Transitioning to orals today as able.  Bowel: Abd soft nondistended but tender. Dressing intact, dry. Drain in place with 25ml serosanguinous fluid.  Diet: Tolerating clear liquids. Advance today.  IVFs: Remains on IVF until po intake adequate.  Activity: Up ad karan.  Antibiotics: None indicated.  DVT prophylaxis: Heparin 5k BID.   Dispo: Pending pain control and po intake. Likely another day.        Interval History:   Some nausea overnight but improved. Tolerating some liquids and jello.         Physical Exam:   Temp: 98.2  F (36.8  C) Temp src: Oral BP: 112/71   Heart Rate: 71 Resp: 16 SpO2: 96 % O2 Device: None (Room air) Oxygen Delivery: 2 LPM    I/O last 3 completed shifts:  In: 1700 [I.V.:1700]  Out: 770 [Urine:725; Drains:25; Blood:20]    Constitutional: healthy, alert and no distress   Cardiovascular: Regular rate.  Respiratory: Breathing RA easily.  Abdomen: Soft but tender. Drain in place with appropriate serous output.    Jeffery Olivas MD  General Surgery PGY4  [phone]

## 2017-11-07 NOTE — PROGRESS NOTES
Patient voiding very little. Bladder scan was >500. Patient wanted to ambulate for a while before voiding next time and will re-scan her. Order to st. cath one more time if PVR is >250. Continue to monitor.

## 2017-11-07 NOTE — PLAN OF CARE
Problem: Patient Care Overview  Goal: Discharge Needs Assessment  Outcome: No Change  PRIMARY DIAGNOSIS: Post op day 0 Abd wall reconstruction  OUTPATIENT/OBSERVATION GOALS TO BE MET BEFORE DISCHARGE:  1. Stable vital signs Yes  2. Tolerating diet:Yes-tolerating clears  3. Pain controlled with oral pain medications:  No-taking PRN dilaudid  4. Positive bowel sounds:  Yes  5. Voiding without difficulty:  No--straight cathed for 650ml  6. Able to ambulate:  No  7. Provider specific discharge goals met:  No     Discharge Planner Nurse   Safe discharge environment identified: Yes  Barriers to discharge: Yes       Entered by: Brittney Irvin 11/06/2017 10:29 PM     Please review provider order for any additional goals.   Nurse to notify provider when observation goals have been met and patient is ready for discharge.

## 2017-11-07 NOTE — PLAN OF CARE
Problem: Patient Care Overview  Goal: Plan of Care/Patient Progress Review  Outcome: Improving  A&Ox4. VSS. IVF infusing. SBA. PCDs on. Ambulating in room to bathroom, voided multiple times samll amount. PVR shows >500. St cath done once., tried going again,. Pain managed w/ IV dilaudid and Oxycodone. CLD. . MATIAS drain to suction, Abd dx CDI, sm amount of dried drainage, outlined. CMS intact. On regular diet but not eating much, just doing liquid. Continue to monitor.

## 2017-11-08 PROBLEM — Z41.9 SURGERY, ELECTIVE: Status: ACTIVE | Noted: 2017-11-08

## 2017-11-08 LAB — GLUCOSE BLDC GLUCOMTR-MCNC: 104 MG/DL (ref 70–99)

## 2017-11-08 PROCEDURE — 00000146 ZZHCL STATISTIC GLUCOSE BY METER IP

## 2017-11-08 PROCEDURE — 96372 THER/PROPH/DIAG INJ SC/IM: CPT

## 2017-11-08 PROCEDURE — 25000128 H RX IP 250 OP 636: Performed by: SURGERY

## 2017-11-08 PROCEDURE — 25000125 ZZHC RX 250: Performed by: SURGERY

## 2017-11-08 PROCEDURE — 25000132 ZZH RX MED GY IP 250 OP 250 PS 637: Performed by: SURGERY

## 2017-11-08 PROCEDURE — 12000007 ZZH R&B INTERMEDIATE

## 2017-11-08 PROCEDURE — G0378 HOSPITAL OBSERVATION PER HR: HCPCS

## 2017-11-08 RX ADMIN — OXYCODONE HYDROCHLORIDE 5 MG: 5 TABLET ORAL at 06:30

## 2017-11-08 RX ADMIN — OXYCODONE HYDROCHLORIDE 5 MG: 5 TABLET ORAL at 03:23

## 2017-11-08 RX ADMIN — IBUPROFEN 600 MG: 600 TABLET ORAL at 03:34

## 2017-11-08 RX ADMIN — OXYCODONE HYDROCHLORIDE 5 MG: 5 TABLET ORAL at 23:54

## 2017-11-08 RX ADMIN — ACETAMINOPHEN 650 MG: 325 TABLET, FILM COATED ORAL at 00:02

## 2017-11-08 RX ADMIN — PROCHLORPERAZINE MALEATE 10 MG: 5 TABLET, FILM COATED ORAL at 20:46

## 2017-11-08 RX ADMIN — IBUPROFEN 600 MG: 600 TABLET ORAL at 10:57

## 2017-11-08 RX ADMIN — OXYCODONE HYDROCHLORIDE 5 MG: 5 TABLET ORAL at 20:46

## 2017-11-08 RX ADMIN — SENNOSIDES AND DOCUSATE SODIUM 1 TABLET: 8.6; 5 TABLET ORAL at 20:46

## 2017-11-08 RX ADMIN — HEPARIN SODIUM 5000 UNITS: 5000 INJECTION, SOLUTION INTRAVENOUS; SUBCUTANEOUS at 23:25

## 2017-11-08 RX ADMIN — IBUPROFEN 600 MG: 600 TABLET ORAL at 23:55

## 2017-11-08 RX ADMIN — PROCHLORPERAZINE MALEATE 10 MG: 5 TABLET, FILM COATED ORAL at 14:31

## 2017-11-08 RX ADMIN — PROCHLORPERAZINE MALEATE 10 MG: 5 TABLET, FILM COATED ORAL at 08:35

## 2017-11-08 RX ADMIN — IBUPROFEN 600 MG: 600 TABLET ORAL at 17:38

## 2017-11-08 RX ADMIN — ONDANSETRON 4 MG: 4 TABLET, ORALLY DISINTEGRATING ORAL at 07:53

## 2017-11-08 RX ADMIN — HEPARIN SODIUM 5000 UNITS: 5000 INJECTION, SOLUTION INTRAVENOUS; SUBCUTANEOUS at 11:54

## 2017-11-08 RX ADMIN — OXYCODONE HYDROCHLORIDE 5 MG: 5 TABLET ORAL at 14:31

## 2017-11-08 RX ADMIN — OXYCODONE HYDROCHLORIDE 5 MG: 5 TABLET ORAL at 00:22

## 2017-11-08 RX ADMIN — OXYCODONE HYDROCHLORIDE 5 MG: 5 TABLET ORAL at 17:32

## 2017-11-08 RX ADMIN — ACETAMINOPHEN 650 MG: 325 TABLET, FILM COATED ORAL at 05:57

## 2017-11-08 RX ADMIN — HEPARIN SODIUM 5000 UNITS: 5000 INJECTION, SOLUTION INTRAVENOUS; SUBCUTANEOUS at 00:23

## 2017-11-08 RX ADMIN — OXYCODONE HYDROCHLORIDE 5 MG: 5 TABLET ORAL at 10:57

## 2017-11-08 ASSESSMENT — PAIN DESCRIPTION - DESCRIPTORS: DESCRIPTORS: ACHING

## 2017-11-08 NOTE — PLAN OF CARE
OUTPATIENT/OBSERVATION GOALS TO BE MET BEFORE DISCHARGE:  1. Stable vital signs Yes  2. Tolerating diet:Yes-tolerating regular diet  3. Pain controlled with oral pain medications:  Yes oxycodone and acetaminophen  4. Positive bowel sounds:  Yes  5. Voiding without difficulty:  Yes  6. Able to ambulate:  Yes  7. Provider specific discharge goals met:  No      Discharge Planner Nurse   Safe discharge environment identified: Yes  Barriers to discharge: Yes

## 2017-11-08 NOTE — PROGRESS NOTES
[facility]    General Surgery  Daily Post-Op Note       Assessment and Plan:   Payton Graf is a 42 year old female S/P Procedure(s):  RECONSTRUCT ABDOMINAL WALL, 2 Days Post-Op. Slow return of bowel function, hopefully home later today.    Pain: Controlled on po meds overnight.  Bowel: Tolerated reg diet last night, some nausea this am after small amount of breakfast. Zofran helping, did go home with this after previous surgery. Reports passing no gas yet.  Activity: Up ad karan. Dressing removed from midline and port sites. OK to shower later today, Do not submerge until drain out in one week at follow up.  DVT prophylaxis: Continue subq heparin until discharge.   Dispo: Later today if nausea controlled vs tomorrow.        Interval History:   Did well yesterday and overnight, tolerating po well and pain controlled but increasing nausea this am.           Physical Exam:   Temp: 98.9  F (37.2  C) Temp src: Oral BP: 120/76 Pulse: 85 Heart Rate: 74 Resp: 16 SpO2: 94 % O2 Device: None (Room air)      I/O last 3 completed shifts:  In: -   Out: 4315 [Urine:4280; Drains:35]      Constitutional: healthy, alert and mildly uncomfortable from nausea.  Cardiovascular: Regular rate.  Respiratory: RA.  Abdomen: soft, tender at incision, mildly distended.    Jeffery Olivas MD  General Surgery PGY4  [phone]

## 2017-11-08 NOTE — PROGRESS NOTES
VSS. A/O. Nausea this morning after eating a few bites of breakfast. Dressing removed from midline incision by Resident. Left CHAUNCEY, WNL. MATIAS drain dressing changed, site WNL. MATIAS with minimal s/s output. Voiding now with no issues. Up independently. Tylenol, Oxycodone and Ibuprofen controlling pain.     Report called to 33

## 2017-11-08 NOTE — PLAN OF CARE
Problem: Patient Care Overview  Goal: Plan of Care/Patient Progress Review  Outcome: Improving  OUTPATIENT/OBSERVATION GOALS TO BE MET BEFORE DISCHARGE:  1. Stable vital signs Yes  2. Tolerating diet:Yes-tolerating regular diet  3. Pain controlled with oral pain medications:  Yes oxycodone and acetaminophen  4. Positive bowel sounds:  Yes  5. Voiding without difficulty:  Yes  6. Able to ambulate:  Yes  7. Provider specific discharge goals met:  No      Discharge Planner Nurse   Safe discharge environment identified: Yes  Barriers to discharge: Yes

## 2017-11-08 NOTE — PLAN OF CARE
Problem: Patient Care Overview  Goal: Plan of Care/Patient Progress Review  Outcome: Improving  VSS, voiding well this evening, appetite good; ate regular diet this evening. A+O x 4, dressing dry with the exception of old drainage. Small amount out in MATIAS. Oxy given q3 + acetaminophen. Independent in room, good family support.

## 2017-11-08 NOTE — PLAN OF CARE
Problem: Patient Care Overview  Goal: Plan of Care/Patient Progress Review  Outcome: No Change  A&Ox4. VSS on RA. Up independently. C/O lower abdominal pain-PRN oxy, tylenol and ibprofen given with relief. Voiding adequaatly. Dressing with dry drainage-has not increased since beginning of shift. Small amount of MATIAS drainage. Plan to d/c today.

## 2017-11-08 NOTE — PLAN OF CARE
"Problem: Patient Care Overview  Goal: Plan of Care/Patient Progress Review  Outcome: Improving  PRIMARY DIAGNOSIS: \"GENERIC\" NURSING  OUTPATIENT/OBSERVATION GOALS TO BE MET BEFORE DISCHARGE:  1. ADLs back to baseline: Yes      2. Activity and level of assistance: Ambulating independently.      3. Pain status: Improved-controlled with oral pain medications.      4. Return to near baseline physical activity: Yes          Discharge Planner Nurse   Safe discharge environment identified: Yes  Barriers to discharge: No       Entered by: Morena Taylor 11/08/2017 8:05 AM     Please review provider order for any additional goals.   Nurse to notify provider when observation goals have been met and patient is ready for discharge.      "

## 2017-11-08 NOTE — PLAN OF CARE
OUTPATIENT/OBSERVATION GOALS TO BE MET BEFORE DISCHARGE:  1. Stable vital signs Yes  2. Tolerating diet:Yes-tolerating clears  3. Pain controlled with oral pain medications:  Yes oxycodone and dulaudid  4. Positive bowel sounds:  Yes  5. Voiding without difficulty:  No  6. Able to ambulate:  Yes  7. Provider specific discharge goals met:  No      Discharge Planner Nurse   Safe discharge environment identified: Yes  Barriers to discharge: Yes

## 2017-11-09 VITALS
DIASTOLIC BLOOD PRESSURE: 83 MMHG | WEIGHT: 201.3 LBS | BODY MASS INDEX: 30.51 KG/M2 | OXYGEN SATURATION: 96 % | SYSTOLIC BLOOD PRESSURE: 128 MMHG | TEMPERATURE: 98.3 F | HEART RATE: 79 BPM | HEIGHT: 68 IN | RESPIRATION RATE: 18 BRPM

## 2017-11-09 LAB
CREAT SERPL-MCNC: 0.71 MG/DL (ref 0.52–1.04)
GFR SERPL CREATININE-BSD FRML MDRD: 89 ML/MIN/1.7M2
PLATELET # BLD AUTO: 237 10E9/L (ref 150–450)

## 2017-11-09 PROCEDURE — 25000132 ZZH RX MED GY IP 250 OP 250 PS 637: Performed by: SURGERY

## 2017-11-09 PROCEDURE — 36415 COLL VENOUS BLD VENIPUNCTURE: CPT | Performed by: SURGERY

## 2017-11-09 PROCEDURE — 85049 AUTOMATED PLATELET COUNT: CPT | Performed by: SURGERY

## 2017-11-09 PROCEDURE — 82565 ASSAY OF CREATININE: CPT | Performed by: SURGERY

## 2017-11-09 RX ORDER — OXYCODONE HYDROCHLORIDE 5 MG/1
5 TABLET ORAL
Qty: 30 TABLET | Refills: 0
Start: 2017-11-09

## 2017-11-09 RX ORDER — AMOXICILLIN 250 MG
1 CAPSULE ORAL 2 TIMES DAILY
Qty: 30 TABLET
Start: 2017-11-09

## 2017-11-09 RX ORDER — ONDANSETRON 4 MG/1
4 TABLET, ORALLY DISINTEGRATING ORAL EVERY 6 HOURS PRN
Qty: 12 TABLET | Refills: 0 | Status: SHIPPED | OUTPATIENT
Start: 2017-11-09

## 2017-11-09 RX ADMIN — IBUPROFEN 600 MG: 600 TABLET ORAL at 05:28

## 2017-11-09 RX ADMIN — OXYCODONE HYDROCHLORIDE 5 MG: 5 TABLET ORAL at 05:28

## 2017-11-09 RX ADMIN — PROCHLORPERAZINE MALEATE 10 MG: 5 TABLET, FILM COATED ORAL at 05:28

## 2017-11-09 RX ADMIN — SENNOSIDES AND DOCUSATE SODIUM 1 TABLET: 8.6; 5 TABLET ORAL at 09:28

## 2017-11-09 RX ADMIN — OXYCODONE HYDROCHLORIDE 5 MG: 5 TABLET ORAL at 09:31

## 2017-11-09 ASSESSMENT — PAIN DESCRIPTION - DESCRIPTORS: DESCRIPTORS: ACHING

## 2017-11-09 NOTE — PLAN OF CARE
Problem: Patient Care Overview  Goal: Plan of Care/Patient Progress Review  Outcome: No Change  VSS on RA. Midline incision intact with adhesive strips. Binder in place. Pain managed with PRN ibuprofen and oxycodone. Denied nausea overnight, requested compazine once this AM before breakfast. Up independently, ambulated in halls. +BS, +flatus. LS clear. Voiding. Tolerating PO. Likely d/c later today.

## 2017-11-09 NOTE — DISCHARGE INSTRUCTIONS
Discharge Instructions following General Abdominal Surgery  [facility]    Diet:    Diet as instructed by surgeon.  Avoid gas forming foods.  You may find your appetite to be diminished briefly after surgery.  Drink plenty of fluids, especially water.  Activities:    Activity as tolerated and instructed by surgeon.  Take frequent, short walks.  No strenuous activity or heavy lifting (greater than 10-15 lbs) until approved by surgeon.  Bathing/Incision Care    Ok to shower.  Do not submerge incision (no tub baths or swimming) until advised by surgeon.  Pat incisions dry.  If present, staples will be removed in the office.  If present, tape dressing (Steri-Strips) will fall off on their own in 7-10 days.  No lotion, powders, or perfumes near or on the incision.  What to expect:    A tingly or itchy sensation around the incision is a sign of normal healing.    A small amount of clear, pink drainage from the incision is normal.  Call your surgeon if you have these signs or symptoms:    Fever greater than 101 oF or chills.    Redness, swelling, warmth, foul-smelling drainage from incision.    Increased pain that does not improve with pain medicine.    With any questions or concerns.

## 2017-11-09 NOTE — PLAN OF CARE
A/O. Up independently. Oxycodone for incision pain with relief. Taking regular diet without nausea. Discharge instructions and meds given to patient Patient discharged via w/c and escort.

## 2017-11-09 NOTE — PROGRESS NOTES
[facility]    General Surgery  Daily Post-Op Note       Assessment and Plan:   Payton Graf is a 42 year old female S/P Procedure(s):  RECONSTRUCT ABDOMINAL WALL, 3 Days Post-Op    Pain: Well controlled primarily with ibuprofen and tylenol. Roxicodone to supplement.  Bowel: Passing gas, hungry, no nausea.  Diet: Regular diet this am, reverted to clears and soft foods yesterday with improvement in symptoms.  Activity: Up ad karan.  DVT prophylaxis: Does NOT need to discharge with DVT ppx.  Dispo: Home today after breakfast if tolerating well. Drain teaching completed. To see Dr. Melchor in one week.        Interval History:   Much improved symptoms overnight. Eager to discharge.         Physical Exam:   Temp: 97.8  F (36.6  C) Temp src: Oral BP: 111/64 Pulse: 79 Heart Rate: 69 Resp: 16 SpO2: 100 % O2 Device: None (Room air)      I/O last 3 completed shifts:  In: 2223 [P.O.:2223]  Out: 3620 [Urine:3600; Drains:20]    Constitutional: healthy, alert and no distress   Cardiovascular: Regular rate  Abdomen: Soft, less distended. Minimal serous output to bulb. Steri-strips intact.     Jeffery Olivas MD  General Surgery PGY4  [phone]

## 2017-11-09 NOTE — PLAN OF CARE
Problem: Surgery Nonspecified (Adult)  Goal: Signs and Symptoms of Listed Potential Problems Will be Absent, Minimized or Managed (Surgery Nonspecified)  Signs and symptoms of listed potential problems will be absent, minimized or managed by discharge/transition of care (reference Surgery Nonspecified (Adult) CPG).   Outcome: Improving  A/O. VSS. LS clear. +BS, +flatus. C/O nausea. Abd incision CDI/CHAUNCEY. MATIAS patent with SS output. Voiding adequately. PRN oxy for pain. PRN compazine x1 for nausea. Up independently. Tolerating regular diet.

## 2017-11-10 NOTE — DISCHARGE SUMMARY
Admission Date: 11/6/2017  Dismissal Date: 11/9/2017    Diagnoses:  Patient Active Problem List   Diagnosis     Post-operative pain     Surgery, elective       Consultants:  None    Procedures Performed:  Open and laparoscopic abdominal hernia repair with skin excision    Brief Clinical History: Please see the H&P    Hospital Course:  Patient had a slight ileus but resolved on POD 3. Her pain was well controlled with oral medications. She otherwise had a non-complicated postoperative course. No significant complications occurred and the patient was ready for dismissal to home after meeting all discharge criteria.      Instructions:  Diet: Return to preoperative diet as tolerated.  Activity: Slowly return to regular activity as tolerated. No lifting over 20lbs for 4 weeks.   Medications: Resume home medications   Followup: 1 weeks in surgical office for drain removal    Questions answered.    Condition on discharge: Stable

## 2017-11-14 ENCOUNTER — OFFICE VISIT (OUTPATIENT)
Dept: SURGERY | Facility: CLINIC | Age: 42
End: 2017-11-14
Payer: COMMERCIAL

## 2017-11-14 DIAGNOSIS — Z09 SURGERY FOLLOW-UP: Primary | ICD-10-CM

## 2017-11-14 PROCEDURE — 99024 POSTOP FOLLOW-UP VISIT: CPT | Performed by: SURGERY

## 2017-11-14 RX ORDER — TRAMADOL HYDROCHLORIDE 50 MG/1
50-100 TABLET ORAL EVERY 6 HOURS PRN
Qty: 40 TABLET | Refills: 0 | Status: SHIPPED | OUTPATIENT
Start: 2017-11-14

## 2017-11-14 NOTE — LETTER
2017    Re: Payton Graf - 1975    Doing well. Tolerating diet. Had first bowel movement yesterday and a normal movement today. Drain with less than 15 cc for the last 3 days. Starting to manipulate pain management and is only taking narcotics at night. She was complaining of slight dizziness today but improved after getting some water.     Abdomen Soft Mild tenderness to palpation incision No hernias palpated. Incision-Healing well      A/P  Payton Graf is recovering well from laparoscopic and open ventral hernia with mesh. The patient appears to be doing very well and his weaning pain medications. I advised her to decreased her amount of Tylenol and we will give her tramadol as needed. Her drain had less than 15 cc for a series of days and was removed without issues. Her dizziness improved with water intake but I advised her to follow up with her primary care physician or go to the ER if it is persisting or worsening. I will see her in 2-3 weeks.     Thank you for the opportunity to help in her care.     Vishnu Melchor M.D.  [facility]  [phone]

## 2017-11-14 NOTE — MR AVS SNAPSHOT
"              After Visit Summary   11/14/2017    Payton Graf    MRN: [id number]           Patient Information     Date Of Birth          1975        Visit Information        Provider Department      11/14/2017 1:45 PM Vishnu Melchor MD [facility]      Today's Diagnoses     Surgery follow-up    -  1       Follow-ups after your visit        Your next 10 appointments already scheduled     Nov 30, 2017 11:45 AM CST   Post Op with Vishnu Melchor MD   [facility]    [address]  [facility] [id number] [phone]              Who to contact     If you have questions or need follow up information about today's clinic visit or your schedule please contact [facility] directly at [phone].  Normal or non-critical lab and imaging results will be communicated to you by FabriQatehart, letter or phone within 4 business days after the clinic has received the results. If you do not hear from us within 7 days, please contact the clinic through FabriQatehart or phone. If you have a critical or abnormal lab result, we will notify you by phone as soon as possible.  Submit refill requests through TMAT or call your pharmacy and they will forward the refill request to us. Please allow 3 business days for your refill to be completed.          Additional Information About Your Visit        MyChart Information     TMAT lets you send messages to your doctor, view your test results, renew your prescriptions, schedule appointments and more. To sign up, go to [email or web address] . Click on \"Log in\" on the left side of the screen, which will take you to the Welcome page. Then click on \"Sign up Now\" on the right side of the page.     You will be asked to enter the access code listed below, as well as some personal information. Please follow the directions to create your username and " password.     Your access code is: [email or web address]  Expires: 11/15/2017 10:13 AM     Your access code will  in 90 days. If you need help or a new code, please call your [place] clinic or [phone].        Care EveryWhere ID     This is your Care EveryWhere ID. This could be used by other organizations to access your [place] medical records  [id number]        Your Vitals Were     Last Period                   2017            Blood Pressure from Last 3 Encounters:   17 128/83   17 129/83   04 120/78    Weight from Last 3 Encounters:   17 201 lb 4.8 oz (91.3 kg)   17 190 lb (86.2 kg)   04 156 lb (70.8 kg)              Today, you had the following     No orders found for display         Today's Medication Changes          These changes are accurate as of: 17  2:36 PM.  If you have any questions, ask your nurse or doctor.               Start taking these medicines.        Dose/Directions    traMADol 50 MG tablet   Commonly known as:  ULTRAM   Used for:  Surgery follow-up   Started by:  Vishnu Melchor MD        Dose:   mg   Take 1-2 tablets ( mg) by mouth every 6 hours as needed for pain Maximum 10 tablet(s) per day   Quantity:  40 tablet   Refills:  0            Where to get your medicines      Some of these will need a paper prescription and others can be bought over the counter.  Ask your nurse if you have questions.     Bring a paper prescription for each of these medications     traMADol 50 MG tablet                Primary Care Provider Office Phone # Fax #    Alla Salazar Dahiana Limon [phone]       [facility] [place] [address]  [facility] [address]        Equal Access to Services     ANDRE RUSSELL AH: Michael Lynch, benigno lunghiaadaha, qahowardta kaalmada hugo, fifi anderson. So [facility] [phone].    ATENCIÓN: Si habla español, tiene a ramirez disposición servicios gratuitos de  asistencia lingüística. Lamar al [phone].    We comply with applicable federal civil rights laws and [place] laws. We do not discriminate on the basis of race, color, national origin, age, disability, sex, sexual orientation, or gender identity.            Thank you!     Thank you for choosing [facility]  for your care. Our goal is always to provide you with excellent care. Hearing back from our patients is one way we can continue to improve our services. Please take a few minutes to complete the written survey that you may receive in the mail after your visit with us. Thank you!             Your Updated Medication List - Protect others around you: Learn how to safely use, store and throw away your medicines at www.disposemymeds.org.          This list is accurate as of: 11/14/17  2:36 PM.  Always use your most recent med list.                   Brand Name Dispense Instructions for use Diagnosis    ASPIRIN PO      Take 81 mg by mouth daily as needed for moderate pain (before travelling)        calcium carbonate 500 MG chewable tablet    TUMS     Take 1 chew tab by mouth daily as needed for heartburn        GAS-X PO      Take 1 tablet by mouth 4 times daily as needed for intestinal gas        IBUPROFEN PO      Take 400 mg by mouth 2 times daily as needed for moderate pain        MAGNESIUM CITRATE PO      Take 1 Dose by mouth every evening        ondansetron 4 MG ODT tab    ZOFRAN-ODT    12 tablet    Take 1 tablet (4 mg) by mouth every 6 hours as needed for nausea or vomiting    Ventral hernia without obstruction or gangrene       * oxyCODONE IR 5 MG tablet    ROXICODONE    30 tablet    Take 1-2 tablets (5-10 mg) by mouth every 3 hours as needed for other (Moderate to Severe Pain)    Ventral hernia without obstruction or gangrene       * oxyCODONE IR 5 MG tablet    ROXICODONE    30 tablet    Take 1 tablet (5 mg) by mouth every 3 hours as needed for moderate to severe pain    Ventral hernia  without obstruction or gangrene       PROBIOTIC + OMEGA-3 PO      Take 1 tablet by mouth daily        * senna-docusate 8.6-50 MG per tablet    SENOKOT-S;PERICOLACE    30 tablet    Take 1-2 tablets by mouth 2 times daily    Ventral hernia without obstruction or gangrene       * senna-docusate 8.6-50 MG per tablet    SENOKOT-S;PERICOLACE    30 tablet    Take 1 tablet by mouth 2 times daily    Ventral hernia without obstruction or gangrene       traMADol 50 MG tablet    ULTRAM    40 tablet    Take 1-2 tablets ( mg) by mouth every 6 hours as needed for pain Maximum 10 tablet(s) per day    Surgery follow-up       VITAMIN C PO      Take 1 Dose by mouth daily as needed        VITAMIN D (CHOLECALCIFEROL) PO      Take 5,000 Units by mouth daily as needed        * Notice:  This list has 4 medication(s) that are the same as other medications prescribed for you. Read the directions carefully, and ask your doctor or other care provider to review them with you.

## 2017-11-15 NOTE — PROGRESS NOTES
Surgery Postoperative Note    Doing well. Tolerating diet. Had first bowel movement yesterday and a normal movement today. Drain with less than 15 cc for the last 3 days. Starting to manipulate pain management and is only taking narcotics at night. She was complaining of slight dizziness today but improved after getting some water.    Abdomen Soft Mild tenderness to palpation incision No hernias palpated. Incision-Healing well     A/P  Payton Graf is recovering well from laparoscopic and open ventral hernia with mesh. The patient appears to be doing very well and his weaning pain medications. I advised her to decreased her amount of Tylenol and we will give her tramadol as needed. Her drain had less than 15 cc for a series of days and was removed without issues. Her dizziness improved with water intake but I advised her to follow up with her primary care physician or go to the ER if it is persisting or worsening. I will see her in 2-3 weeks.    Thank you for the opportunity to help in her care.    Vishnu Melchor M.D.  [facility]  [phone]    Please route or send letter to:  Primary Care Provider (PCP) and Referring Provider

## 2017-11-30 ENCOUNTER — OFFICE VISIT (OUTPATIENT)
Dept: SURGERY | Facility: CLINIC | Age: 42
End: 2017-11-30
Payer: COMMERCIAL

## 2017-11-30 DIAGNOSIS — Z09 SURGERY FOLLOW-UP: Primary | ICD-10-CM

## 2017-11-30 PROCEDURE — 99024 POSTOP FOLLOW-UP VISIT: CPT | Performed by: SURGERY

## 2017-11-30 NOTE — LETTER
2017    Re: Payton Graf - 1975    Doing well. Feels much better. Starting to go for longer walks and tolerated daily activities better. No significant issues.     Abdomen Soft. No tenderness. Incision completely healed.     A/P  Payton Graf is recovering well from laparoscopic and open ventral hernia with mesh. She has made great improvement. Still has episodes of pain with significant activity but improving. No issues with wound healing. She can get back to regular activity as tolerated. I advised the patient to limit activities if it causes discomfort. She can return as needed if any issues arise.     Thank you for the opportunity to help in her care.     Vishnu Melchor M.D.  [facility]  [phone]

## 2017-11-30 NOTE — MR AVS SNAPSHOT
"              After Visit Summary   2017    Payton Graf    MRN: [id number]           Patient Information     Date Of Birth          1975        Visit Information        Provider Department      2017 11:45 AM Vishnu Melchor MD [facility]      Today's Diagnoses     Surgery follow-up    -  1       Follow-ups after your visit        Who to contact     If you have questions or need follow up information about today's clinic visit or your schedule please contact [facility] directly at [phone].  Normal or non-critical lab and imaging results will be communicated to you by The Finance Scholarhart, letter or phone within 4 business days after the clinic has received the results. If you do not hear from us within 7 days, please contact the clinic through The [employer] Scholarhart or phone. If you have a critical or abnormal lab result, we will notify you by phone as soon as possible.  Submit refill requests through Showpad or call your pharmacy and they will forward the refill request to us. Please allow 3 business days for your refill to be completed.          Additional Information About Your Visit        MyChart Information     Showpad lets you send messages to your doctor, view your test results, renew your prescriptions, schedule appointments and more. To sign up, go to [email or web address] . Click on \"Log in\" on the left side of the screen, which will take you to the Welcome page. Then click on \"Sign up Now\" on the right side of the page.     You will be asked to enter the access code listed below, as well as some personal information. Please follow the directions to create your username and password.     Your access code is: [email or web address]  Expires: 2018 11:57 AM     Your access code will  in 90 days. If you need help or a new code, please call your [place] clinic or [phone].        Care EveryWhere ID     This is your Care " EveryWhere ID. This could be used by other organizations to access your [place] medical records  [id number]        Your Vitals Were     Last Period                   11/04/2017            Blood Pressure from Last 3 Encounters:   11/09/17 128/83   08/17/17 129/83   01/20/04 120/78    Weight from Last 3 Encounters:   11/06/17 201 lb 4.8 oz (91.3 kg)   08/17/17 190 lb (86.2 kg)   01/20/04 156 lb (70.8 kg)              Today, you had the following     No orders found for display       Primary Care Provider Office Phone # Fax #    Alla Limon [phone]       [facility] [address]  [facility] [address]        Equal Access to Services     ANRDE RUSSELL : Hadii aad ku hadasho Soomaali, waaxda luqadaha, qaybta kaalmada adeegyada, waxay dalilain corky kaiser . So [facility] [phone].    ATENCIÓN: Si habla español, tiene a ramirez disposición servicios gratuitos de asistencia lingüística. Lamar al [phone].    We comply with applicable federal civil rights laws and [place] laws. We do not discriminate on the basis of race, color, national origin, age, disability, sex, sexual orientation, or gender identity.            Thank you!     Thank you for choosing [facility]  for your care. Our goal is always to provide you with excellent care. Hearing back from our patients is one way we can continue to improve our services. Please take a few minutes to complete the written survey that you may receive in the mail after your visit with us. Thank you!             Your Updated Medication List - Protect others around you: Learn how to safely use, store and throw away your medicines at www.disposemymeds.org.          This list is accurate as of: 11/30/17 11:57 AM.  Always use your most recent med list.                   Brand Name Dispense Instructions for use Diagnosis    ASPIRIN PO      Take 81 mg by mouth daily as needed for moderate pain (before travelling)         calcium carbonate 500 MG chewable tablet    TUMS     Take 1 chew tab by mouth daily as needed for heartburn        GAS-X PO      Take 1 tablet by mouth 4 times daily as needed for intestinal gas        IBUPROFEN PO      Take 400 mg by mouth 2 times daily as needed for moderate pain        MAGNESIUM CITRATE PO      Take 1 Dose by mouth every evening        ondansetron 4 MG ODT tab    ZOFRAN-ODT    12 tablet    Take 1 tablet (4 mg) by mouth every 6 hours as needed for nausea or vomiting    Ventral hernia without obstruction or gangrene       * oxyCODONE IR 5 MG tablet    ROXICODONE    30 tablet    Take 1-2 tablets (5-10 mg) by mouth every 3 hours as needed for other (Moderate to Severe Pain)    Ventral hernia without obstruction or gangrene       * oxyCODONE IR 5 MG tablet    ROXICODONE    30 tablet    Take 1 tablet (5 mg) by mouth every 3 hours as needed for moderate to severe pain    Ventral hernia without obstruction or gangrene       PROBIOTIC + OMEGA-3 PO      Take 1 tablet by mouth daily        * senna-docusate 8.6-50 MG per tablet    SENOKOT-S;PERICOLACE    30 tablet    Take 1-2 tablets by mouth 2 times daily    Ventral hernia without obstruction or gangrene       * senna-docusate 8.6-50 MG per tablet    SENOKOT-S;PERICOLACE    30 tablet    Take 1 tablet by mouth 2 times daily    Ventral hernia without obstruction or gangrene       traMADol 50 MG tablet    ULTRAM    40 tablet    Take 1-2 tablets ( mg) by mouth every 6 hours as needed for pain Maximum 10 tablet(s) per day    Surgery follow-up       VITAMIN C PO      Take 1 Dose by mouth daily as needed        VITAMIN D (CHOLECALCIFEROL) PO      Take 5,000 Units by mouth daily as needed        * Notice:  This list has 4 medication(s) that are the same as other medications prescribed for you. Read the directions carefully, and ask your doctor or other care provider to review them with you.

## 2017-11-30 NOTE — PROGRESS NOTES
Surgery Postoperative Note    Doing well. Feels much better. Starting to go for longer walks and tolerated daily activities better. No significant issues.    Abdomen Soft. No tenderness. Incision completely healed.    A/P  Payton Graf is recovering well from laparoscopic and open ventral hernia with mesh. She has made great improvement. Still has episodes of pain with significant activity but improving. No issues with wound healing. She can get back to regular activity as tolerated. I advised the patient to limit activities if it causes discomfort. She can return as needed if any issues arise.    Thank you for the opportunity to help in her care.    Vishnu Melchor M.D.  [facility]  [phone]    Please route or send letter to:  Primary Care Provider (PCP) and Referring Provider

## 2021-01-26 ENCOUNTER — AMBULATORY - HEALTHEAST (OUTPATIENT)
Dept: NURSING | Facility: CLINIC | Age: 46
End: 2021-01-26

## 2021-02-16 ENCOUNTER — AMBULATORY - HEALTHEAST (OUTPATIENT)
Dept: NURSING | Facility: CLINIC | Age: 46
End: 2021-02-16

## 2021-05-28 ENCOUNTER — RECORDS - HEALTHEAST (OUTPATIENT)
Dept: ADMINISTRATIVE | Facility: CLINIC | Age: 46
End: 2021-05-28

## 2025-01-20 ENCOUNTER — LAB REQUISITION (OUTPATIENT)
Dept: LAB | Facility: CLINIC | Age: 50
End: 2025-01-20
Payer: COMMERCIAL

## 2025-01-20 DIAGNOSIS — R73.9 HYPERGLYCEMIA, UNSPECIFIED: ICD-10-CM

## 2025-01-20 DIAGNOSIS — R73.03 PREDIABETES: ICD-10-CM

## 2025-01-20 LAB
EST. AVERAGE GLUCOSE BLD GHB EST-MCNC: 140 MG/DL
HBA1C MFR BLD: 6.5 %

## 2025-01-20 PROCEDURE — 83036 HEMOGLOBIN GLYCOSYLATED A1C: CPT | Mod: ORL | Performed by: DOULA

## (undated) DEVICE — ENDO TROCAR FIRST ENTRY KII FIOS ADV FIX 05X100MM CFF03

## (undated) DEVICE — SU SILK 2-0 FSL 18" 677G

## (undated) DEVICE — BNDG ABDOMINAL BINDER 9X30-45" 79-89070

## (undated) DEVICE — ESU CORD MONOPOLAR 10'  E0510

## (undated) DEVICE — DRAIN JACKSON PRATT 15FR ROUND SU130-1323

## (undated) DEVICE — SYR 10ML LL W/O NDL

## (undated) DEVICE — ANTIFOG SOLUTION W/FOAM PAD 31142527

## (undated) DEVICE — PACK MINOR SBA15MIFSE

## (undated) DEVICE — DRAPE LAP W/ARMBOARD 29410

## (undated) DEVICE — SU PDS II 1 TP-1 48" Z880G

## (undated) DEVICE — DRSG STERI STRIP 1/2X4" R1547

## (undated) DEVICE — DEVICE TACKER ENDO RELIATACK ART RELOAD 8 DEEP RELTACK8RDPT

## (undated) DEVICE — DRSG GAUZE 4X4" 3033

## (undated) DEVICE — GLOVE PROTEXIS W/NEU-THERA 7.5  2D73TE75

## (undated) DEVICE — DEVICE TACKER ENDO RELIATACK ARTIC HANDLE RELTACK4XDPT

## (undated) DEVICE — BLADE KNIFE SURG 10 371110

## (undated) DEVICE — NDL INSUFFLATION 120MM VERRES 172015

## (undated) DEVICE — LINEN TOWEL PACK X5 5464

## (undated) DEVICE — SU VICRYL 3-0 SH 27" J316H

## (undated) DEVICE — DRAIN JACKSON PRATT RESERVOIR 100ML SU130-1305

## (undated) DEVICE — SU VICRYL 2-0 CT-1 27" J339H

## (undated) DEVICE — ESU ELEC BLADE 2.75" COATED/INSULATED E1455

## (undated) DEVICE — DRSG BANDAID 1X3" FABRIC CURITY LATEX FREE KC44101

## (undated) DEVICE — NDL 22GA 1.5"

## (undated) DEVICE — SU MONOCRYL 4-0 PS-2 18" UND Y496G

## (undated) DEVICE — GLOVE GAMMEX DERMAPRENE ULTRA SZ 8 LF 8516

## (undated) DEVICE — PREP CHLORAPREP 26ML TINTED ORANGE  260815

## (undated) DEVICE — NDL 19GA 1.5"

## (undated) DEVICE — SOL BENZOIN 0.5OZ

## (undated) DEVICE — SUCTION TIP YANKAUER STR K87

## (undated) DEVICE — ENDO TROCAR FIRST ENTRY KII FIOS Z-THRD 05X100MM CTF03

## (undated) RX ORDER — DIPHENHYDRAMINE HYDROCHLORIDE 50 MG/ML
INJECTION INTRAMUSCULAR; INTRAVENOUS
Status: DISPENSED
Start: 2017-11-06

## (undated) RX ORDER — PROPOFOL 10 MG/ML
INJECTION, EMULSION INTRAVENOUS
Status: DISPENSED
Start: 2017-11-06

## (undated) RX ORDER — CEFAZOLIN SODIUM 2 G/100ML
INJECTION, SOLUTION INTRAVENOUS
Status: DISPENSED
Start: 2017-11-06

## (undated) RX ORDER — ONDANSETRON 2 MG/ML
INJECTION INTRAMUSCULAR; INTRAVENOUS
Status: DISPENSED
Start: 2017-11-06

## (undated) RX ORDER — GLYCOPYRROLATE 0.2 MG/ML
INJECTION, SOLUTION INTRAMUSCULAR; INTRAVENOUS
Status: DISPENSED
Start: 2017-11-06

## (undated) RX ORDER — ACETAMINOPHEN 10 MG/ML
INJECTION, SOLUTION INTRAVENOUS
Status: DISPENSED
Start: 2017-11-06

## (undated) RX ORDER — VECURONIUM BROMIDE 1 MG/ML
INJECTION, POWDER, LYOPHILIZED, FOR SOLUTION INTRAVENOUS
Status: DISPENSED
Start: 2017-11-06

## (undated) RX ORDER — DEXAMETHASONE SODIUM PHOSPHATE 4 MG/ML
INJECTION, SOLUTION INTRA-ARTICULAR; INTRALESIONAL; INTRAMUSCULAR; INTRAVENOUS; SOFT TISSUE
Status: DISPENSED
Start: 2017-11-06

## (undated) RX ORDER — KETOROLAC TROMETHAMINE 30 MG/ML
INJECTION, SOLUTION INTRAMUSCULAR; INTRAVENOUS
Status: DISPENSED
Start: 2017-11-06

## (undated) RX ORDER — EPINEPHRINE 1 MG/ML
INJECTION, SOLUTION INTRAMUSCULAR; SUBCUTANEOUS
Status: DISPENSED
Start: 2017-11-06

## (undated) RX ORDER — HYDROMORPHONE HYDROCHLORIDE 1 MG/ML
INJECTION, SOLUTION INTRAMUSCULAR; INTRAVENOUS; SUBCUTANEOUS
Status: DISPENSED
Start: 2017-11-06

## (undated) RX ORDER — LIDOCAINE HYDROCHLORIDE 10 MG/ML
INJECTION, SOLUTION EPIDURAL; INFILTRATION; INTRACAUDAL; PERINEURAL
Status: DISPENSED
Start: 2017-11-06

## (undated) RX ORDER — LIDOCAINE HYDROCHLORIDE 20 MG/ML
INJECTION, SOLUTION EPIDURAL; INFILTRATION; INTRACAUDAL; PERINEURAL
Status: DISPENSED
Start: 2017-11-06

## (undated) RX ORDER — CEFAZOLIN SODIUM 1 G/3ML
INJECTION, POWDER, FOR SOLUTION INTRAMUSCULAR; INTRAVENOUS
Status: DISPENSED
Start: 2017-11-06

## (undated) RX ORDER — BUPIVACAINE HYDROCHLORIDE 2.5 MG/ML
INJECTION, SOLUTION EPIDURAL; INFILTRATION; INTRACAUDAL
Status: DISPENSED
Start: 2017-11-06

## (undated) RX ORDER — SCOLOPAMINE TRANSDERMAL SYSTEM 1 MG/1
PATCH, EXTENDED RELEASE TRANSDERMAL
Status: DISPENSED
Start: 2017-11-06

## (undated) RX ORDER — FENTANYL CITRATE 50 UG/ML
INJECTION, SOLUTION INTRAMUSCULAR; INTRAVENOUS
Status: DISPENSED
Start: 2017-11-06